# Patient Record
Sex: FEMALE | Race: BLACK OR AFRICAN AMERICAN | NOT HISPANIC OR LATINO | URBAN - METROPOLITAN AREA
[De-identification: names, ages, dates, MRNs, and addresses within clinical notes are randomized per-mention and may not be internally consistent; named-entity substitution may affect disease eponyms.]

---

## 2018-03-14 ENCOUNTER — OUTPATIENT (OUTPATIENT)
Dept: OUTPATIENT SERVICES | Facility: HOSPITAL | Age: 73
LOS: 1 days | End: 2018-03-14
Payer: COMMERCIAL

## 2018-03-14 DIAGNOSIS — E11.9 TYPE 2 DIABETES MELLITUS WITHOUT COMPLICATIONS: ICD-10-CM

## 2018-03-14 DIAGNOSIS — R06.09 OTHER FORMS OF DYSPNEA: ICD-10-CM

## 2018-03-14 DIAGNOSIS — Z98.89 OTHER SPECIFIED POSTPROCEDURAL STATES: Chronic | ICD-10-CM

## 2018-03-14 DIAGNOSIS — Z96.652 PRESENCE OF LEFT ARTIFICIAL KNEE JOINT: Chronic | ICD-10-CM

## 2018-03-14 PROCEDURE — A9500: CPT

## 2018-03-14 PROCEDURE — 93017 CV STRESS TEST TRACING ONLY: CPT

## 2018-03-14 PROCEDURE — A9505: CPT

## 2018-03-14 PROCEDURE — 78452 HT MUSCLE IMAGE SPECT MULT: CPT

## 2018-03-26 ENCOUNTER — APPOINTMENT (OUTPATIENT)
Dept: OTOLARYNGOLOGY | Facility: CLINIC | Age: 73
End: 2018-03-26
Payer: MEDICARE

## 2018-03-26 PROCEDURE — 99204 OFFICE O/P NEW MOD 45 MIN: CPT | Mod: 25

## 2018-03-26 PROCEDURE — 31575 DIAGNOSTIC LARYNGOSCOPY: CPT

## 2018-04-12 ENCOUNTER — OUTPATIENT (OUTPATIENT)
Dept: OUTPATIENT SERVICES | Facility: HOSPITAL | Age: 73
LOS: 1 days | End: 2018-04-12
Payer: COMMERCIAL

## 2018-04-12 ENCOUNTER — APPOINTMENT (OUTPATIENT)
Dept: ULTRASOUND IMAGING | Facility: HOSPITAL | Age: 73
End: 2018-04-12

## 2018-04-12 ENCOUNTER — RESULT REVIEW (OUTPATIENT)
Age: 73
End: 2018-04-12

## 2018-04-12 DIAGNOSIS — Z98.89 OTHER SPECIFIED POSTPROCEDURAL STATES: Chronic | ICD-10-CM

## 2018-04-12 DIAGNOSIS — Z96.652 PRESENCE OF LEFT ARTIFICIAL KNEE JOINT: Chronic | ICD-10-CM

## 2018-04-12 PROCEDURE — 10022: CPT

## 2018-04-12 PROCEDURE — 76942 ECHO GUIDE FOR BIOPSY: CPT | Mod: 26

## 2018-04-12 PROCEDURE — 76942 ECHO GUIDE FOR BIOPSY: CPT

## 2018-04-12 PROCEDURE — 88173 CYTOPATH EVAL FNA REPORT: CPT

## 2018-04-13 LAB — NON-GYNECOLOGICAL CYTOLOGY STUDY: SIGNIFICANT CHANGE UP

## 2018-05-08 ENCOUNTER — APPOINTMENT (OUTPATIENT)
Dept: OTOLARYNGOLOGY | Facility: CLINIC | Age: 73
End: 2018-05-08
Payer: MEDICARE

## 2018-05-08 VITALS
SYSTOLIC BLOOD PRESSURE: 183 MMHG | WEIGHT: 194 LBS | DIASTOLIC BLOOD PRESSURE: 113 MMHG | HEART RATE: 78 BPM | HEIGHT: 64 IN | BODY MASS INDEX: 33.12 KG/M2

## 2018-05-08 PROCEDURE — 99214 OFFICE O/P EST MOD 30 MIN: CPT

## 2018-06-22 VITALS
HEART RATE: 78 BPM | DIASTOLIC BLOOD PRESSURE: 98 MMHG | WEIGHT: 192.9 LBS | TEMPERATURE: 97 F | RESPIRATION RATE: 16 BRPM | HEIGHT: 64 IN | OXYGEN SATURATION: 92 % | SYSTOLIC BLOOD PRESSURE: 155 MMHG

## 2018-06-22 NOTE — H&P ADULT - PSH
H/O laparoscopy  perforated pyloric ulcer  H/O total knee replacement, left  2010  History of bunionectomy of both great toes

## 2018-06-22 NOTE — H&P ADULT - ASSESSMENT
73 yr old F with FHx heart disease, PMHx of HTN, hyperlipidemia, DM, chronic gastritis, osteoarthritis, spinal stenosis s/p decompression surgery in 2016, who initially presented to cardiologist c/o progressively worsening YANG x 3 months, who in light of patient's risk factors, CCS Angina Class III equivalent symptoms and abnormal NST, patient now presents for recommended cardiac catheterization with possible intervention.     ASA: II    Mallampati: IV    Precath/consented  IVF NS @ 75cc/hr  Loaded with ASA 325mg PO x 1 and Plavix 600mg PO x 1

## 2018-06-22 NOTE — H&P ADULT - NSHPLABSRESULTS_GEN_ALL_CORE
13.1   5.5   )-----------( 174      ( 25 Jun 2018 08:43 )             41.0      EKG: Sinus @ 70BPM, LAE, QT/QTc 428/462

## 2018-06-22 NOTE — H&P ADULT - HISTORY OF PRESENT ILLNESS
**WILL BRING MEDS***      73 yr old F with FHx heart disease, PMHx of HTN, hyperlipidemia, DM, chronic gastritis, osteoarthritis, spinal stenosis s/p decompression surgery in 2016, who initially presented to cardiologist c/o progressively worsening YANG x 3 months. Patient states she has noticed a significant decline in her ET which was unlimited with ambulation to now <1/2 city block due to SOB. Patient denies any chest pain, palpitations, dizziness, syncope, PND, orthopnea or LE edema. EKG in the office 6/18/18 revealed NSR with LAE. (unchanged from prior EKG's) Echocardiogram 1/26/18 revealed normal LV size and systolic function, EF >55%. Mild LVH with evidence of diastolic LV dysfunction.     As per MD note, Nuclear Stress test revealed a small area of apical ischemia on perfusion imaging.     In light of patients risk factors, CCS Angina Class III equivalent symptoms and abnormal NST, patient now presents for recommended cardiac catheterization with possible intervention. **WILL BRING MEDS***      73 yr old F with FHx heart disease, PMHx of HTN, hyperlipidemia, DM, chronic gastritis, osteoarthritis, spinal stenosis s/p decompression surgery in 2016, who initially presented to cardiologist c/o progressively worsening YANG x 3 months. Patient states she has noticed a significant decline in her ET which was unlimited with ambulation to now <1/2 city block due to SOB. Patient denies any chest pain, palpitations, dizziness, syncope, PND, orthopnea or LE edema. EKG in the office 6/18/18 revealed NSR with LAE. (unchanged from prior EKG's) Echocardiogram 1/26/18 revealed normal LV size and systolic function, EF >55%. Mild LVH with evidence of diastolic LV dysfunction.     Patient further underwent Nuclear Stress test 3/14/18 which revealed a small area ischemia in the apex, EF:65%.    In light of patients risk factors, CCS Angina Class III equivalent symptoms and abnormal NST, patient now presents for recommended cardiac catheterization with possible intervention. 73 yr old F with FHx heart disease, PMHx of HTN, hyperlipidemia, DM, chronic gastritis, osteoarthritis, spinal stenosis s/p decompression surgery in 2016, who initially presented to cardiologist c/o progressively worsening YANG x 3 months. Patient states she has noticed a significant decline in her ET which was unlimited with ambulation to now <1/2 city block due to SOB. Patient denies any chest pain, palpitations, dizziness, syncope, PND, orthopnea or LE edema. EKG in the office 6/18/18 revealed NSR with LAE. (unchanged from prior EKG's) Echocardiogram 1/26/18 revealed normal LV size and systolic function, EF >55%. Mild LVH with evidence of diastolic LV dysfunction. Patient further underwent Nuclear Stress test 3/14/18 which revealed a small area ischemia in the apex, EF:65%. In light of patients risk factors, CCS Angina Class III equivalent symptoms and abnormal NST, patient now presents for recommended cardiac catheterization with possible intervention.

## 2018-06-22 NOTE — H&P ADULT - PMH
Diverticulitis    DM (diabetes mellitus)  type 2  HLD (hyperlipidemia)    HTN (hypertension)    Lumbar stenosis    OA (osteoarthritis)

## 2018-06-25 ENCOUNTER — OUTPATIENT (OUTPATIENT)
Dept: OUTPATIENT SERVICES | Facility: HOSPITAL | Age: 73
LOS: 1 days | Discharge: NURSING, REHAB OR LONG-TERM CARE | End: 2018-06-25
Payer: COMMERCIAL

## 2018-06-25 DIAGNOSIS — Z98.89 OTHER SPECIFIED POSTPROCEDURAL STATES: Chronic | ICD-10-CM

## 2018-06-25 DIAGNOSIS — Z96.652 PRESENCE OF LEFT ARTIFICIAL KNEE JOINT: Chronic | ICD-10-CM

## 2018-06-25 LAB
ANION GAP SERPL CALC-SCNC: 8 MMOL/L — SIGNIFICANT CHANGE UP (ref 5–17)
APTT BLD: 40.2 SEC — HIGH (ref 27.5–37.4)
BASOPHILS NFR BLD AUTO: 0.2 % — SIGNIFICANT CHANGE UP (ref 0–2)
BUN SERPL-MCNC: 21 MG/DL — SIGNIFICANT CHANGE UP (ref 7–23)
CALCIUM SERPL-MCNC: 9.4 MG/DL — SIGNIFICANT CHANGE UP (ref 8.4–10.5)
CHLORIDE SERPL-SCNC: 102 MMOL/L — SIGNIFICANT CHANGE UP (ref 96–108)
CHOLEST SERPL-MCNC: 164 MG/DL — SIGNIFICANT CHANGE UP (ref 10–199)
CO2 SERPL-SCNC: 28 MMOL/L — SIGNIFICANT CHANGE UP (ref 22–31)
CREAT SERPL-MCNC: 0.88 MG/DL — SIGNIFICANT CHANGE UP (ref 0.5–1.3)
CRP SERPL-MCNC: 0.1 MG/DL — SIGNIFICANT CHANGE UP (ref 0–0.4)
EOSINOPHIL NFR BLD AUTO: 2.7 % — SIGNIFICANT CHANGE UP (ref 0–6)
GLUCOSE SERPL-MCNC: 128 MG/DL — HIGH (ref 70–99)
HBA1C BLD-MCNC: 6.6 % — HIGH (ref 4–5.6)
HCT VFR BLD CALC: 41 % — SIGNIFICANT CHANGE UP (ref 34.5–45)
HDLC SERPL-MCNC: 52 MG/DL — SIGNIFICANT CHANGE UP (ref 40–125)
HGB BLD-MCNC: 13.1 G/DL — SIGNIFICANT CHANGE UP (ref 11.5–15.5)
INR BLD: 1.24 — HIGH (ref 0.88–1.16)
LIPID PNL WITH DIRECT LDL SERPL: 99 MG/DL — SIGNIFICANT CHANGE UP
LYMPHOCYTES # BLD AUTO: 42.1 % — SIGNIFICANT CHANGE UP (ref 13–44)
MCHC RBC-ENTMCNC: 27.6 PG — SIGNIFICANT CHANGE UP (ref 27–34)
MCHC RBC-ENTMCNC: 32 G/DL — SIGNIFICANT CHANGE UP (ref 32–36)
MCV RBC AUTO: 86.3 FL — SIGNIFICANT CHANGE UP (ref 80–100)
MONOCYTES NFR BLD AUTO: 7 % — SIGNIFICANT CHANGE UP (ref 2–14)
NEUTROPHILS NFR BLD AUTO: 48 % — SIGNIFICANT CHANGE UP (ref 43–77)
PLATELET # BLD AUTO: 174 K/UL — SIGNIFICANT CHANGE UP (ref 150–400)
POTASSIUM SERPL-MCNC: SIGNIFICANT CHANGE UP MMOL/L (ref 3.5–5.3)
POTASSIUM SERPL-SCNC: SIGNIFICANT CHANGE UP MMOL/L (ref 3.5–5.3)
PROTHROM AB SERPL-ACNC: 13.8 SEC — HIGH (ref 9.8–12.7)
RBC # BLD: 4.75 M/UL — SIGNIFICANT CHANGE UP (ref 3.8–5.2)
RBC # FLD: 15.2 % — SIGNIFICANT CHANGE UP (ref 10.3–16.9)
SODIUM SERPL-SCNC: 138 MMOL/L — SIGNIFICANT CHANGE UP (ref 135–145)
TOTAL CHOLESTEROL/HDL RATIO MEASUREMENT: 3.2 RATIO — LOW (ref 3.3–7.1)
TRIGL SERPL-MCNC: 67 MG/DL — SIGNIFICANT CHANGE UP (ref 10–149)
WBC # BLD: 5.5 K/UL — SIGNIFICANT CHANGE UP (ref 3.8–10.5)
WBC # FLD AUTO: 5.5 K/UL — SIGNIFICANT CHANGE UP (ref 3.8–10.5)

## 2018-06-25 PROCEDURE — 82553 CREATINE MB FRACTION: CPT

## 2018-06-25 PROCEDURE — C1769: CPT

## 2018-06-25 PROCEDURE — 80061 LIPID PANEL: CPT

## 2018-06-25 PROCEDURE — 93571 IV DOP VEL&/PRESS C FLO 1ST: CPT | Mod: RC

## 2018-06-25 PROCEDURE — 85730 THROMBOPLASTIN TIME PARTIAL: CPT

## 2018-06-25 PROCEDURE — 36415 COLL VENOUS BLD VENIPUNCTURE: CPT

## 2018-06-25 PROCEDURE — 93458 L HRT ARTERY/VENTRICLE ANGIO: CPT

## 2018-06-25 PROCEDURE — 85610 PROTHROMBIN TIME: CPT

## 2018-06-25 PROCEDURE — 93005 ELECTROCARDIOGRAM TRACING: CPT

## 2018-06-25 PROCEDURE — 80048 BASIC METABOLIC PNL TOTAL CA: CPT

## 2018-06-25 PROCEDURE — 82962 GLUCOSE BLOOD TEST: CPT

## 2018-06-25 PROCEDURE — 86140 C-REACTIVE PROTEIN: CPT

## 2018-06-25 PROCEDURE — 93458 L HRT ARTERY/VENTRICLE ANGIO: CPT | Mod: 26

## 2018-06-25 PROCEDURE — 82550 ASSAY OF CK (CPK): CPT

## 2018-06-25 PROCEDURE — 85025 COMPLETE CBC W/AUTO DIFF WBC: CPT

## 2018-06-25 PROCEDURE — C1887: CPT

## 2018-06-25 PROCEDURE — 93010 ELECTROCARDIOGRAM REPORT: CPT

## 2018-06-25 PROCEDURE — 83036 HEMOGLOBIN GLYCOSYLATED A1C: CPT

## 2018-06-25 PROCEDURE — 93571 IV DOP VEL&/PRESS C FLO 1ST: CPT | Mod: 26,59,RC

## 2018-06-25 RX ORDER — SODIUM CHLORIDE 9 MG/ML
500 INJECTION INTRAMUSCULAR; INTRAVENOUS; SUBCUTANEOUS
Qty: 0 | Refills: 0 | Status: DISCONTINUED | OUTPATIENT
Start: 2018-06-25 | End: 2018-06-25

## 2018-06-25 RX ORDER — HYDRALAZINE HCL 50 MG
5 TABLET ORAL ONCE
Qty: 0 | Refills: 0 | Status: COMPLETED | OUTPATIENT
Start: 2018-06-25 | End: 2018-06-25

## 2018-06-25 RX ORDER — METOPROLOL TARTRATE 50 MG
100 TABLET ORAL DAILY
Qty: 0 | Refills: 0 | Status: DISCONTINUED | OUTPATIENT
Start: 2018-06-26 | End: 2018-06-25

## 2018-06-25 RX ORDER — CHLORHEXIDINE GLUCONATE 213 G/1000ML
1 SOLUTION TOPICAL ONCE
Qty: 0 | Refills: 0 | Status: DISCONTINUED | OUTPATIENT
Start: 2018-06-25 | End: 2018-06-25

## 2018-06-25 RX ORDER — PANTOPRAZOLE SODIUM 20 MG/1
40 TABLET, DELAYED RELEASE ORAL DAILY
Qty: 0 | Refills: 0 | Status: DISCONTINUED | OUTPATIENT
Start: 2018-06-25 | End: 2018-06-25

## 2018-06-25 RX ORDER — ASPIRIN/CALCIUM CARB/MAGNESIUM 324 MG
325 TABLET ORAL ONCE
Qty: 0 | Refills: 0 | Status: COMPLETED | OUTPATIENT
Start: 2018-06-25 | End: 2018-06-25

## 2018-06-25 RX ORDER — ASPIRIN/CALCIUM CARB/MAGNESIUM 324 MG
81 TABLET ORAL DAILY
Qty: 0 | Refills: 0 | Status: DISCONTINUED | OUTPATIENT
Start: 2018-06-26 | End: 2018-06-25

## 2018-06-25 RX ORDER — CLOPIDOGREL BISULFATE 75 MG/1
600 TABLET, FILM COATED ORAL ONCE
Qty: 0 | Refills: 0 | Status: COMPLETED | OUTPATIENT
Start: 2018-06-25 | End: 2018-06-25

## 2018-06-25 RX ADMIN — Medication 325 MILLIGRAM(S): at 09:35

## 2018-06-25 RX ADMIN — Medication 5 MILLIGRAM(S): at 12:52

## 2018-06-25 RX ADMIN — CLOPIDOGREL BISULFATE 600 MILLIGRAM(S): 75 TABLET, FILM COATED ORAL at 09:35

## 2018-06-25 RX ADMIN — SODIUM CHLORIDE 75 MILLILITER(S): 9 INJECTION INTRAMUSCULAR; INTRAVENOUS; SUBCUTANEOUS at 11:51

## 2018-06-25 NOTE — PROGRESS NOTE ADULT - SUBJECTIVE AND OBJECTIVE BOX
Interventional Cardiology PA SDA Discharge Note    Patient without complaints. Ambulated and voided without difficulties    Afebrile, VSS    Ext: Right Radial : No hematoma, no bleeding, dressing; C/D/I      Pulses:    intact RAD/DP/PT to baseline     A/P:  73 yr old F with FHx heart disease, PMHx of HTN, hyperlipidemia, DM, chronic gastritis, osteoarthritis, spinal stenosis s/p decompression surgery in 2016, who initially presented to cardiologist c/o progressively worsening YANG x 3 months, who in light of patient's risk factors, CCS Angina Class III equivalent symptoms and abnormal NST, patient presented for recommended cardiac catheterization. Pt is now s/p diagnostic cardiac cath on 6/25/18 rvealing non-obstructive CAD, 30-50% mLAD, EF 60%, no AS, 1+ MR. Recommended optimization of medical management and f/u with Dr. Wilkerson.      1.	Stable for discharge as per attending Dr. Pham after bed rest, pt voids, groin/wrist stable and 30 minutes of ambulation.  2.	Follow-up with PMD/Cardiologist Dr. Wilkerson in 1-2 weeks  3.	Discharged forms signed and copies in chart

## 2018-08-14 ENCOUNTER — APPOINTMENT (OUTPATIENT)
Dept: OTOLARYNGOLOGY | Facility: CLINIC | Age: 73
End: 2018-08-14
Payer: MEDICARE

## 2018-08-14 VITALS
HEART RATE: 76 BPM | WEIGHT: 184 LBS | BODY MASS INDEX: 31.41 KG/M2 | DIASTOLIC BLOOD PRESSURE: 118 MMHG | HEIGHT: 64 IN | SYSTOLIC BLOOD PRESSURE: 178 MMHG

## 2018-08-14 DIAGNOSIS — R05 COUGH: ICD-10-CM

## 2018-08-14 DIAGNOSIS — R07.0 PAIN IN THROAT: ICD-10-CM

## 2018-08-14 DIAGNOSIS — R68.89 OTHER GENERAL SYMPTOMS AND SIGNS: ICD-10-CM

## 2018-08-14 PROCEDURE — 99214 OFFICE O/P EST MOD 30 MIN: CPT

## 2019-06-14 ENCOUNTER — APPOINTMENT (OUTPATIENT)
Dept: PULMONOLOGY | Facility: CLINIC | Age: 74
End: 2019-06-14
Payer: MEDICARE

## 2019-06-14 VITALS
HEART RATE: 80 BPM | WEIGHT: 187.31 LBS | TEMPERATURE: 97.4 F | HEIGHT: 64 IN | DIASTOLIC BLOOD PRESSURE: 80 MMHG | BODY MASS INDEX: 31.98 KG/M2 | SYSTOLIC BLOOD PRESSURE: 140 MMHG | OXYGEN SATURATION: 96 %

## 2019-06-14 PROCEDURE — 99204 OFFICE O/P NEW MOD 45 MIN: CPT | Mod: 25

## 2019-06-14 PROCEDURE — 94010 BREATHING CAPACITY TEST: CPT

## 2019-06-14 RX ORDER — AMOXICILLIN 500 MG/1
500 CAPSULE ORAL
Qty: 16 | Refills: 0 | Status: DISCONTINUED | COMMUNITY
Start: 2017-11-29 | End: 2019-06-14

## 2019-06-14 NOTE — PHYSICAL EXAM
[Normal Conjunctiva] : the conjunctiva exhibited no abnormalities [Normal Oropharynx] : normal oropharynx [Heart Sounds] : normal S1 and S2 [Heart Rate And Rhythm] : heart rate and rhythm were normal [Murmurs] : no murmurs present [Auscultation Breath Sounds / Voice Sounds] : lungs were clear to auscultation bilaterally [Bowel Sounds] : normal bowel sounds [Abdomen Soft] : soft [Abdomen Tenderness] : non-tender [FreeTextEntry1] : no palpable abnormality in painful area, no change in pain with palpation [Nail Clubbing] : no clubbing of the fingernails [Cyanosis, Localized] : no localized cyanosis [] : no rash [Affect] : the affect was normal

## 2019-06-14 NOTE — CONSULT LETTER
[Dear  ___] : Dear  [unfilled], [( Thank you for referring [unfilled] for consultation for _____ )] : Thank you for referring [unfilled] for consultation for [unfilled] [Please see my note below.] : Please see my note below. [Consult Closing:] : Thank you very much for allowing me to participate in the care of this patient.  If you have any questions, please do not hesitate to contact me. [Sincerely,] : Sincerely, [FreeTextEntry2] : Dionisio Sharma MD\par [address] \[place] [address] [FreeTextEntry3] : Christy Fish MD, FCCP\par

## 2019-06-14 NOTE — ASSESSMENT
[FreeTextEntry1] : Data reviewed:\par \par CT chest [facility] 5/2019 personally reviewed: enlarged PA, some mild basilar fibrotic change, no source of pain, no mass\par \par Arnoldo 06/14/2019 : restricted\par \par Impression:\par Musculoskeletal chest pain\par \par Plan:\par This severe pain does not have a pulmonary etiology. She says Dr Sharma is sending her to pain management. Would suggest as well consideration of physiatry evaluation.

## 2019-06-14 NOTE — REVIEW OF SYSTEMS
[As Noted in HPI] : as noted in HPI [Diabetes] : diabetes mellitus [Trauma] : no ~T physical trauma [Negative] : Psychiatric

## 2019-06-14 NOTE — HISTORY OF PRESENT ILLNESS
[FreeTextEntry1] : 06/14/2019: Asked to evaluate patient by Dr Sharma for chest pain, 45 min late for new eval due to [employer]. Since April pain in rib cage starting in midaxillary line on R side. Became very severe, progressively worse, difficult to do her activities. Had imaging, didn't show anything. Gets dyspneic w the pain. Movement makes it worse but not breathing. Aching, stabbing, squeezing. No leg swelling or pain. Remote social smoking, never a daily smoker. Never asthma. She was given baclofen and takes Tylenol and that helps.\par

## 2019-07-25 ENCOUNTER — APPOINTMENT (OUTPATIENT)
Dept: NEUROSURGERY | Facility: CLINIC | Age: 74
End: 2019-07-25
Payer: MEDICARE

## 2019-07-25 VITALS
OXYGEN SATURATION: 95 % | HEIGHT: 64 IN | DIASTOLIC BLOOD PRESSURE: 97 MMHG | WEIGHT: 190 LBS | RESPIRATION RATE: 16 BRPM | SYSTOLIC BLOOD PRESSURE: 182 MMHG | HEART RATE: 63 BPM | BODY MASS INDEX: 32.44 KG/M2

## 2019-07-25 DIAGNOSIS — I10 ESSENTIAL (PRIMARY) HYPERTENSION: ICD-10-CM

## 2019-07-25 PROCEDURE — 99204 OFFICE O/P NEW MOD 45 MIN: CPT

## 2019-07-25 RX ORDER — ACETAMINOPHEN 325 MG/1
TABLET, FILM COATED ORAL
Refills: 0 | Status: ACTIVE | COMMUNITY

## 2019-07-29 ENCOUNTER — OUTPATIENT (OUTPATIENT)
Dept: OUTPATIENT SERVICES | Facility: HOSPITAL | Age: 74
LOS: 1 days | End: 2019-07-29
Payer: COMMERCIAL

## 2019-07-29 DIAGNOSIS — M54.6 PAIN IN THORACIC SPINE: ICD-10-CM

## 2019-07-29 DIAGNOSIS — Z98.89 OTHER SPECIFIED POSTPROCEDURAL STATES: Chronic | ICD-10-CM

## 2019-07-29 DIAGNOSIS — Z96.652 PRESENCE OF LEFT ARTIFICIAL KNEE JOINT: Chronic | ICD-10-CM

## 2019-07-29 DIAGNOSIS — Z22.321 CARRIER OR SUSPECTED CARRIER OF METHICILLIN SUSCEPTIBLE STAPHYLOCOCCUS AUREUS: ICD-10-CM

## 2019-07-29 LAB
MRSA PCR RESULT.: NEGATIVE — SIGNIFICANT CHANGE UP
S AUREUS DNA NOSE QL NAA+PROBE: NEGATIVE — SIGNIFICANT CHANGE UP

## 2019-07-29 PROCEDURE — 87641 MR-STAPH DNA AMP PROBE: CPT

## 2019-08-14 NOTE — DATA REVIEWED
[de-identified] : Exam requested by:\par DORIAN TORIBIO MD\par [address]\[place] [address]\par SITE PERFORMED: [facility]\par Patient: ONUR AADMS\par YOB: 1945\par Phone: [phone]\par MRN: [id number] Acc: [id number]\par Date of Exam: 07-\par EXAM:  MRI THORACIC SPINE WITHOUT CONTRAST\par \par HISTORY:  Pain.\par \par TECHNIQUE: MRI of the thoracic spine was performed without intravenous contrast. Sequences include: Sagittal T1, T2, STIR; Axial T2.\par \par COMPARISON: Chest CT performed 5/9/2019.\par \par FINDINGS: There is an intraspinal mass lesion along the dorsal aspect of the spinal canal extending from the lower T3 level superiorly through the upper T5 level inferiorly. The intraspinal component of the mass measures approximately 3.1 cm craniocaudal by 1.0 cm anteroposterior by 1.3 cm transverse. There is a suggestion of indentation of the dorsal thecal sac dura on sagittal T2 series 5 image 8 and axial T2 series 7 image 8, raising the possibility that this may be an extradural, extramedullary lesion rather than intradural, extramedullary. The possibility that this lesion represents extraosseous extension of a lesion arising from the T4 spinous process is considered, given abnormal hyperintense STIR marrow signal replacing the adjacent T4 spinous process. In addition, there is abnormal marrow signal extending into the right and left T4 laminae and transverse processes bilaterally, which may represent mass lesion extension and/or pathologic fracture(s).\par \par Also noted is abnormal hyperintense marrow signal in the left posterior superior aspect of the T4 vertebral body on sagittal STIR series 6 image 6, not meeting definite imaging features for an atypical vertebral hemangioma and suspicious for an additional abnormal marrow lesion. An additional well-circumscribed focus of hyperintense STIR signal along the L1 inferior endplate is nonspecific. \par \par Overall, neoplastic etiologies including multiple myeloma and metastatic disease must be considered.\par \par There is associated compression of the spinal cord spanning the T4 level, from the T3-4 level superiorly through the T4-5 level inferiorly. Possible abnormal spinal cord signal is noted at this level on sagittal STIR and T2-weighted images.\par \par There is mild S-shaped curvature of the thoracic spine. Vertebral body heights and alignment in the sagittal plane are otherwise maintained. There is mild multilevel thoracic disc space narrowing and desiccation.\par \par At T1-2 there is moderate disc bulge indenting the ventral thecal sac.\par \par T2-3, T3-4, C4-5, T6-7, T7-8, T8-9, T9-10, T10-11, T12-L1, L1-2 mild disc bulges.\par \par T11-12 mild/moderate disc bulge with superimposed right foraminal disc protrusion.\par \par There is mild/moderate multilevel facet arthrosis. Neural foraminal narrowing is as follows:\par -Right: Moderate/severe C7-T1, severe T1-2, mild T2-3, moderate T11-12 and L2-3.\par -Left: Moderate/severe C7-T1 and T1-2, mild T2-3, mild T7-8, T8-9, and moderate L2-3.\par \par There are round, well-circumscribed right retrocrural T2 hyperintense nodules measuring up to 1.7 cm, which may represent lymph nodes or perhaps dilated lymphatics. Consider follow-up with dedicated abdominal/pelvic imaging, as warranted clinically.\par \par IMPRESSION:\par 1. Approximately 3.1 x 1.0 x 1.3 cm intraspinal mass lesion spanning the dorsal spinal canal from the lower T3 level through the upper T5 level exhibits imaging features suggestive of extradural location and results in spinal cord compression with possible abnormal spinal cord signal. This mass may be contiguous with a lesion replacing the T4 spinous process, with abnormal marrow signal in the bilateral T4 lamina/transverse processes possibly representing lesion extension and/or pathologic fracture(s). Suggest follow-up with MRI of the thoracic spine with and without contrast.\par 2. Additional marrow lesion in the left posterior superior aspect of the T4 vertebral body.\par 3. Overall, findings are highly suspicious for a neoplastic etiology; metastatic disease and multiple myeloma must be considered, among other entities.\par 4. Round, well-circumscribed right retrocrural T2-hyperintense nodules measuring up to 1.7 cm may represent abnormal lymph nodes or perhaps dilated lymphatics. Consider follow-up with dedicated abdominal/pelvic imaging, as warranted clinically.\par 5. Multilevel degenerative changes.\par \par Discussed via telephone with Marcus Acevedo NP at 11:10 AM on 7/12/2019.\par \par \par Thank you for the opportunity to participate in the care of this patient.  \par  \par Mathieu Mast MD [phone]\par Electronically Signed: 07- 11:18 AM\par Exam requested by:DORIAN TORIBIO MD\par

## 2019-08-14 NOTE — PLAN
[FreeTextEntry1] : \par \par Plan:\par \par 1. CT of the abdomen, chest and pelvis to r/o metastatic disease\par 2. Plan for a T3-4, T4-5 Laminectomy with T4 Decompression surgery on 8/20/19\par 3. Patient will need to see IR for a localizing shiloh of thoracic mass at T4 the morning of surgery\par 4. Patient will see her PCP for medical clearance Per patient, she has questionable  dx of COPD and has no Pulmonologist. PCP will discern if patient will require Pulm Clearance\par \par Patient verbalizes agreement and understanding with plan of care.

## 2019-08-14 NOTE — ADDENDUM
[FreeTextEntry1] : July 25, 2019\par \par Dionisio Sharma MD\par [address] [facility]\[place] [address]\par \par Re:	Yoan Holcomb \par \par Dear Dionisio:\par \par I saw Ms. Holcomb in followup today.  As you know, we performed a lumbar decompression in 2016.  However, she presents now with more or less failure to thrive and pain in her mid-back with lower extremity weakness.  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  I have asked Ms. Holcomb to obtain a full chest, abdomen, and pelvic CT to rule out any secondary source of this tumor, but this is likely a primary spinal tumor with severe cord compression.  Here in the office, she is actually normoreflexic if anything, and there is no obvious evidence of myelopathy, but I do think, due to the severity of her cord compression at T4, that surgical decompression is necessary.  \par \par I have recommended surgical decompression of the tumor with a T3 through T5 laminectomy.  We additionally will rule out a metastasis by performing a full-body CT and will likely recommend placement of a preoperative localizing shiloh to identify the T4 level intraoperatively.\par \par We will hopefully be setting this up in the next few weeks and will need preoperative medical clearance.  I will certainly keep you abreast of her progress and appreciate your kind referral.\par \par Sincerely,\par \par \par \par Maritr Bearden MD\par \par DL/[facility] #[id number]\par \par CC:	Kalin Kiran M.D.\par 	[address] [facility]\[place] [address]\par

## 2019-08-14 NOTE — REASON FOR VISIT
[Consultation] : a consultation visit [Referred By: _________] : Patient was referred by JOSE DE JESUS [FreeTextEntry1] : Thoracic Spine Mass

## 2019-08-14 NOTE — PHYSICAL EXAM
[General Appearance - Alert] : alert [General Appearance - Well Nourished] : well nourished [General Appearance - Well-Appearing] : healthy appearing [Oriented To Time, Place, And Person] : oriented to person, place, and time [No Visual Abnormalities] : no visible abnormailities [Non- Antalgic] : non-antalgic [Intact] : all motor groups within normal limits of strength and tone bilaterally [Sclera] : the sclera and conjunctiva were normal [Outer Ear] : the ears and nose were normal in appearance [Neck Appearance] : the appearance of the neck was normal [] : no respiratory distress [Skin Color & Pigmentation] : normal skin color and pigmentation [Motor Tone] : muscle tone was normal in all four extremities [Motor Strength] : muscle strength was normal in all four extremities [Able to toe walk] : the patient was not able to toe walk [Able to heel walk] : the patient was not able to heel walk [FreeTextEntry1] : shuffling gait

## 2019-08-14 NOTE — HISTORY OF PRESENT ILLNESS
[de-identified] : Patient with a known history of L2-L5 laminectomy in 2016  for debilitating neurogenic claudication with successful symptom results. She has been doing well until she began to experience mid back pain unrelieved by Tylenol since April of this year. She states that she requested additional imaging at that time, but she was instead referred to Dr. Kiran for pain management. She was started on Meloxicam and Baclofen with little symptom relief, so a T-Spine MRI was ordered revealing a 3.1 x1.0 x 1.3 cm mass extending from T3-T5 with T4 cord compression. MRI also noted a marrow lesion in the left posterior aspect of T4 as well as a 1.7cm T2 nodule that is concerning for possible neoplasm and/or metastatic disease. Patient denies BLE weakness and/or pain and does not use assistive devices to aid in ambulation, she does endorse worsening urinary incontinence. \par \par She presents today with images to review and discuss a treatment plan.

## 2019-08-23 ENCOUNTER — MOBILE ON CALL (OUTPATIENT)
Age: 74
End: 2019-08-23

## 2019-08-26 VITALS
RESPIRATION RATE: 16 BRPM | OXYGEN SATURATION: 95 % | DIASTOLIC BLOOD PRESSURE: 84 MMHG | WEIGHT: 186.73 LBS | HEIGHT: 64 IN | SYSTOLIC BLOOD PRESSURE: 168 MMHG | TEMPERATURE: 97 F | HEART RATE: 88 BPM

## 2019-08-27 ENCOUNTER — RESULT REVIEW (OUTPATIENT)
Age: 74
End: 2019-08-27

## 2019-08-27 ENCOUNTER — INPATIENT (INPATIENT)
Facility: HOSPITAL | Age: 74
LOS: 2 days | Discharge: HOME | DRG: 821 | End: 2019-08-30
Attending: NEUROLOGICAL SURGERY | Admitting: NEUROLOGICAL SURGERY
Payer: MEDICARE

## 2019-08-27 DIAGNOSIS — G95.29 OTHER CORD COMPRESSION: ICD-10-CM

## 2019-08-27 DIAGNOSIS — E66.9 OBESITY, UNSPECIFIED: ICD-10-CM

## 2019-08-27 DIAGNOSIS — R22.2 LOCALIZED SWELLING, MASS AND LUMP, TRUNK: ICD-10-CM

## 2019-08-27 DIAGNOSIS — C49.6 MALIGNANT NEOPLASM OF CONNECTIVE AND SOFT TISSUE OF TRUNK, UNSPECIFIED: ICD-10-CM

## 2019-08-27 DIAGNOSIS — Z98.89 OTHER SPECIFIED POSTPROCEDURAL STATES: Chronic | ICD-10-CM

## 2019-08-27 DIAGNOSIS — J44.9 CHRONIC OBSTRUCTIVE PULMONARY DISEASE, UNSPECIFIED: ICD-10-CM

## 2019-08-27 DIAGNOSIS — R09.89 OTHER SPECIFIED SYMPTOMS AND SIGNS INVOLVING THE CIRCULATORY AND RESPIRATORY SYSTEMS: ICD-10-CM

## 2019-08-27 DIAGNOSIS — C90.30 SOLITARY PLASMACYTOMA NOT HAVING ACHIEVED REMISSION: ICD-10-CM

## 2019-08-27 DIAGNOSIS — Z79.4 LONG TERM (CURRENT) USE OF INSULIN: ICD-10-CM

## 2019-08-27 DIAGNOSIS — M19.90 UNSPECIFIED OSTEOARTHRITIS, UNSPECIFIED SITE: ICD-10-CM

## 2019-08-27 DIAGNOSIS — E11.9 TYPE 2 DIABETES MELLITUS WITHOUT COMPLICATIONS: ICD-10-CM

## 2019-08-27 DIAGNOSIS — I10 ESSENTIAL (PRIMARY) HYPERTENSION: ICD-10-CM

## 2019-08-27 DIAGNOSIS — Z96.651 PRESENCE OF RIGHT ARTIFICIAL KNEE JOINT: Chronic | ICD-10-CM

## 2019-08-27 DIAGNOSIS — Z96.652 PRESENCE OF LEFT ARTIFICIAL KNEE JOINT: Chronic | ICD-10-CM

## 2019-08-27 LAB
ANION GAP SERPL CALC-SCNC: 9 MMOL/L — SIGNIFICANT CHANGE UP (ref 5–17)
APTT BLD: 34.2 SEC — SIGNIFICANT CHANGE UP (ref 27.5–36.3)
APTT BLD: 45 SEC — HIGH (ref 27.5–36.3)
BASE EXCESS BLDA CALC-SCNC: -3 MMOL/L — LOW (ref -2–3)
BASOPHILS # BLD AUTO: 0.01 K/UL — SIGNIFICANT CHANGE UP (ref 0–0.2)
BASOPHILS NFR BLD AUTO: 0.2 % — SIGNIFICANT CHANGE UP (ref 0–2)
BUN SERPL-MCNC: 18 MG/DL — SIGNIFICANT CHANGE UP (ref 7–23)
CA-I BLDA-SCNC: 1.16 MMOL/L — SIGNIFICANT CHANGE UP (ref 1.12–1.3)
CALCIUM SERPL-MCNC: 8.8 MG/DL — SIGNIFICANT CHANGE UP (ref 8.4–10.5)
CHLORIDE SERPL-SCNC: 108 MMOL/L — SIGNIFICANT CHANGE UP (ref 96–108)
CO2 SERPL-SCNC: 25 MMOL/L — SIGNIFICANT CHANGE UP (ref 22–31)
COHGB MFR BLDA: 0.7 % — SIGNIFICANT CHANGE UP
CREAT SERPL-MCNC: 0.74 MG/DL — SIGNIFICANT CHANGE UP (ref 0.5–1.3)
EOSINOPHIL # BLD AUTO: 0.04 K/UL — SIGNIFICANT CHANGE UP (ref 0–0.5)
EOSINOPHIL NFR BLD AUTO: 0.9 % — SIGNIFICANT CHANGE UP (ref 0–6)
GAS PNL BLDA: SIGNIFICANT CHANGE UP
GLUCOSE BLDC GLUCOMTR-MCNC: 111 MG/DL — HIGH (ref 70–99)
GLUCOSE BLDC GLUCOMTR-MCNC: 242 MG/DL — HIGH (ref 70–99)
GLUCOSE SERPL-MCNC: 160 MG/DL — HIGH (ref 70–99)
HCO3 BLDA-SCNC: 23 MMOL/L — SIGNIFICANT CHANGE UP (ref 21–28)
HCT VFR BLD CALC: 27.1 % — LOW (ref 34.5–45)
HCT VFR BLD CALC: 34.1 % — LOW (ref 34.5–45)
HGB BLD-MCNC: 10.7 G/DL — LOW (ref 11.5–15.5)
HGB BLD-MCNC: 8.1 G/DL — LOW (ref 11.5–15.5)
HGB BLDA-MCNC: 8.7 G/DL — LOW (ref 11.5–15.5)
IMM GRANULOCYTES NFR BLD AUTO: 0.7 % — SIGNIFICANT CHANGE UP (ref 0–1.5)
INR BLD: 1.09 — SIGNIFICANT CHANGE UP (ref 0.88–1.16)
INR BLD: 1.29 — HIGH (ref 0.88–1.16)
LYMPHOCYTES # BLD AUTO: 1.11 K/UL — SIGNIFICANT CHANGE UP (ref 1–3.3)
LYMPHOCYTES # BLD AUTO: 25.8 % — SIGNIFICANT CHANGE UP (ref 13–44)
MAGNESIUM SERPL-MCNC: 1.8 MG/DL — SIGNIFICANT CHANGE UP (ref 1.6–2.6)
MCHC RBC-ENTMCNC: 27.4 PG — SIGNIFICANT CHANGE UP (ref 27–34)
MCHC RBC-ENTMCNC: 28.2 PG — SIGNIFICANT CHANGE UP (ref 27–34)
MCHC RBC-ENTMCNC: 29.9 GM/DL — LOW (ref 32–36)
MCHC RBC-ENTMCNC: 31.4 GM/DL — LOW (ref 32–36)
MCV RBC AUTO: 90 FL — SIGNIFICANT CHANGE UP (ref 80–100)
MCV RBC AUTO: 91.6 FL — SIGNIFICANT CHANGE UP (ref 80–100)
METHGB MFR BLDA: 0.2 % — SIGNIFICANT CHANGE UP
MONOCYTES # BLD AUTO: 0.13 K/UL — SIGNIFICANT CHANGE UP (ref 0–0.9)
MONOCYTES NFR BLD AUTO: 3 % — SIGNIFICANT CHANGE UP (ref 2–14)
NEUTROPHILS # BLD AUTO: 2.98 K/UL — SIGNIFICANT CHANGE UP (ref 1.8–7.4)
NEUTROPHILS NFR BLD AUTO: 69.4 % — SIGNIFICANT CHANGE UP (ref 43–77)
NRBC # BLD: 0 /100 WBCS — SIGNIFICANT CHANGE UP (ref 0–0)
NRBC # BLD: 0 /100 WBCS — SIGNIFICANT CHANGE UP (ref 0–0)
O2 CT VFR BLDA CALC: SIGNIFICANT CHANGE UP (ref 15–23)
OXYHGB MFR BLDA: 99 % — SIGNIFICANT CHANGE UP (ref 94–100)
PCO2 BLDA: 49 MMHG — HIGH (ref 32–45)
PH BLDA: 7.3 — LOW (ref 7.35–7.45)
PHOSPHATE SERPL-MCNC: 3.5 MG/DL — SIGNIFICANT CHANGE UP (ref 2.5–4.5)
PLATELET # BLD AUTO: 155 K/UL — SIGNIFICANT CHANGE UP (ref 150–400)
PLATELET # BLD AUTO: 205 K/UL — SIGNIFICANT CHANGE UP (ref 150–400)
PO2 BLDA: 216 MMHG — HIGH (ref 83–108)
POTASSIUM BLDA-SCNC: 4 MMOL/L — SIGNIFICANT CHANGE UP (ref 3.5–4.9)
POTASSIUM SERPL-MCNC: 3.9 MMOL/L — SIGNIFICANT CHANGE UP (ref 3.5–5.3)
POTASSIUM SERPL-SCNC: 3.9 MMOL/L — SIGNIFICANT CHANGE UP (ref 3.5–5.3)
PROTHROM AB SERPL-ACNC: 12.4 SEC — SIGNIFICANT CHANGE UP (ref 10–12.9)
PROTHROM AB SERPL-ACNC: 14.7 SEC — HIGH (ref 10–12.9)
RBC # BLD: 2.96 M/UL — LOW (ref 3.8–5.2)
RBC # BLD: 3.79 M/UL — LOW (ref 3.8–5.2)
RBC # FLD: 15.2 % — HIGH (ref 10.3–14.5)
RBC # FLD: 15.7 % — HIGH (ref 10.3–14.5)
SAO2 % BLDA: 100 % — SIGNIFICANT CHANGE UP (ref 95–100)
SODIUM BLDA-SCNC: 135 MMOL/L — LOW (ref 138–146)
SODIUM SERPL-SCNC: 142 MMOL/L — SIGNIFICANT CHANGE UP (ref 135–145)
WBC # BLD: 4.3 K/UL — SIGNIFICANT CHANGE UP (ref 3.8–10.5)
WBC # BLD: 6.98 K/UL — SIGNIFICANT CHANGE UP (ref 3.8–10.5)
WBC # FLD AUTO: 4.3 K/UL — SIGNIFICANT CHANGE UP (ref 3.8–10.5)
WBC # FLD AUTO: 6.98 K/UL — SIGNIFICANT CHANGE UP (ref 3.8–10.5)

## 2019-08-27 PROCEDURE — 69990 MICROSURGERY ADD-ON: CPT

## 2019-08-27 PROCEDURE — 69990 MICROSURGERY ADD-ON: CPT | Mod: 80

## 2019-08-27 PROCEDURE — 99222 1ST HOSP IP/OBS MODERATE 55: CPT

## 2019-08-27 PROCEDURE — 72146 MRI CHEST SPINE W/O DYE: CPT | Mod: 26

## 2019-08-27 PROCEDURE — 63276 BX/EXC XDRL SPINE LESN THRC: CPT

## 2019-08-27 PROCEDURE — 99291 CRITICAL CARE FIRST HOUR: CPT | Mod: 24

## 2019-08-27 PROCEDURE — 63276 BX/EXC XDRL SPINE LESN THRC: CPT | Mod: 80

## 2019-08-27 RX ORDER — BUPIVACAINE 13.3 MG/ML
20 INJECTION, SUSPENSION, LIPOSOMAL INFILTRATION ONCE
Refills: 0 | Status: DISCONTINUED | OUTPATIENT
Start: 2019-08-27 | End: 2019-08-30

## 2019-08-27 RX ORDER — DEXTROSE 50 % IN WATER 50 %
15 SYRINGE (ML) INTRAVENOUS ONCE
Refills: 0 | Status: DISCONTINUED | OUTPATIENT
Start: 2019-08-27 | End: 2019-08-30

## 2019-08-27 RX ORDER — SODIUM CHLORIDE 9 MG/ML
1000 INJECTION, SOLUTION INTRAVENOUS
Refills: 0 | Status: DISCONTINUED | OUTPATIENT
Start: 2019-08-27 | End: 2019-08-30

## 2019-08-27 RX ORDER — ACETAMINOPHEN 500 MG
2 TABLET ORAL
Qty: 0 | Refills: 0 | DISCHARGE

## 2019-08-27 RX ORDER — AMLODIPINE BESYLATE 2.5 MG/1
1 TABLET ORAL
Qty: 0 | Refills: 0 | DISCHARGE

## 2019-08-27 RX ORDER — MAGNESIUM HYDROXIDE 400 MG/1
30 TABLET, CHEWABLE ORAL EVERY 12 HOURS
Refills: 0 | Status: DISCONTINUED | OUTPATIENT
Start: 2019-08-27 | End: 2019-08-30

## 2019-08-27 RX ORDER — CEFAZOLIN SODIUM 1 G
2000 VIAL (EA) INJECTION EVERY 8 HOURS
Refills: 0 | Status: DISCONTINUED | OUTPATIENT
Start: 2019-08-27 | End: 2019-08-27

## 2019-08-27 RX ORDER — OMEPRAZOLE 10 MG/1
1 CAPSULE, DELAYED RELEASE ORAL
Qty: 0 | Refills: 0 | DISCHARGE

## 2019-08-27 RX ORDER — ACETAMINOPHEN 500 MG
1000 TABLET ORAL ONCE
Refills: 0 | Status: COMPLETED | OUTPATIENT
Start: 2019-08-28 | End: 2019-08-28

## 2019-08-27 RX ORDER — PANTOPRAZOLE SODIUM 20 MG/1
40 TABLET, DELAYED RELEASE ORAL
Refills: 0 | Status: DISCONTINUED | OUTPATIENT
Start: 2019-08-27 | End: 2019-08-30

## 2019-08-27 RX ORDER — MORPHINE SULFATE 50 MG/1
2 CAPSULE, EXTENDED RELEASE ORAL EVERY 4 HOURS
Refills: 0 | Status: DISCONTINUED | OUTPATIENT
Start: 2019-08-27 | End: 2019-08-27

## 2019-08-27 RX ORDER — HYDROMORPHONE HYDROCHLORIDE 2 MG/ML
0.5 INJECTION INTRAMUSCULAR; INTRAVENOUS; SUBCUTANEOUS
Refills: 0 | Status: DISCONTINUED | OUTPATIENT
Start: 2019-08-27 | End: 2019-08-29

## 2019-08-27 RX ORDER — DEXTROSE 50 % IN WATER 50 %
12.5 SYRINGE (ML) INTRAVENOUS ONCE
Refills: 0 | Status: DISCONTINUED | OUTPATIENT
Start: 2019-08-27 | End: 2019-08-30

## 2019-08-27 RX ORDER — ONDANSETRON 8 MG/1
4 TABLET, FILM COATED ORAL EVERY 6 HOURS
Refills: 0 | Status: DISCONTINUED | OUTPATIENT
Start: 2019-08-27 | End: 2019-08-30

## 2019-08-27 RX ORDER — METOPROLOL TARTRATE 50 MG
100 TABLET ORAL DAILY
Refills: 0 | Status: DISCONTINUED | OUTPATIENT
Start: 2019-08-27 | End: 2019-08-30

## 2019-08-27 RX ORDER — DEXTROSE 50 % IN WATER 50 %
25 SYRINGE (ML) INTRAVENOUS ONCE
Refills: 0 | Status: DISCONTINUED | OUTPATIENT
Start: 2019-08-27 | End: 2019-08-30

## 2019-08-27 RX ORDER — CYCLOBENZAPRINE HYDROCHLORIDE 10 MG/1
5 TABLET, FILM COATED ORAL THREE TIMES A DAY
Refills: 0 | Status: DISCONTINUED | OUTPATIENT
Start: 2019-08-27 | End: 2019-08-30

## 2019-08-27 RX ORDER — HYDROMORPHONE HYDROCHLORIDE 2 MG/ML
2 INJECTION INTRAMUSCULAR; INTRAVENOUS; SUBCUTANEOUS EVERY 6 HOURS
Refills: 0 | Status: DISCONTINUED | OUTPATIENT
Start: 2019-08-27 | End: 2019-08-30

## 2019-08-27 RX ORDER — SODIUM CHLORIDE 9 MG/ML
1000 INJECTION, SOLUTION INTRAVENOUS
Refills: 0 | Status: DISCONTINUED | OUTPATIENT
Start: 2019-08-27 | End: 2019-08-28

## 2019-08-27 RX ORDER — GLUCAGON INJECTION, SOLUTION 0.5 MG/.1ML
1 INJECTION, SOLUTION SUBCUTANEOUS ONCE
Refills: 0 | Status: DISCONTINUED | OUTPATIENT
Start: 2019-08-27 | End: 2019-08-30

## 2019-08-27 RX ORDER — METOPROLOL TARTRATE 50 MG
1 TABLET ORAL
Qty: 0 | Refills: 0 | DISCHARGE

## 2019-08-27 RX ORDER — OMEGA-3 ACID ETHYL ESTERS 1 G
1 CAPSULE ORAL
Qty: 0 | Refills: 0 | DISCHARGE

## 2019-08-27 RX ORDER — CEFAZOLIN SODIUM 1 G
2000 VIAL (EA) INJECTION EVERY 8 HOURS
Refills: 0 | Status: COMPLETED | OUTPATIENT
Start: 2019-08-27 | End: 2019-08-28

## 2019-08-27 RX ORDER — AMLODIPINE BESYLATE 2.5 MG/1
10 TABLET ORAL DAILY
Refills: 0 | Status: DISCONTINUED | OUTPATIENT
Start: 2019-08-27 | End: 2019-08-30

## 2019-08-27 RX ORDER — DOCUSATE SODIUM 100 MG
100 CAPSULE ORAL THREE TIMES A DAY
Refills: 0 | Status: DISCONTINUED | OUTPATIENT
Start: 2019-08-27 | End: 2019-08-30

## 2019-08-27 RX ORDER — HYDRALAZINE HCL 50 MG
5 TABLET ORAL
Refills: 0 | Status: COMPLETED | OUTPATIENT
Start: 2019-08-27 | End: 2019-08-28

## 2019-08-27 RX ORDER — ACETAMINOPHEN 500 MG
1000 TABLET ORAL ONCE
Refills: 0 | Status: COMPLETED | OUTPATIENT
Start: 2019-08-27 | End: 2019-08-27

## 2019-08-27 RX ORDER — ASPIRIN/CALCIUM CARB/MAGNESIUM 324 MG
1 TABLET ORAL
Qty: 0 | Refills: 0 | DISCHARGE

## 2019-08-27 RX ORDER — INSULIN LISPRO 100/ML
VIAL (ML) SUBCUTANEOUS
Refills: 0 | Status: DISCONTINUED | OUTPATIENT
Start: 2019-08-27 | End: 2019-08-30

## 2019-08-27 RX ADMIN — Medication 5 MILLIGRAM(S): at 15:11

## 2019-08-27 RX ADMIN — Medication 1 TABLET(S): at 16:29

## 2019-08-27 RX ADMIN — Medication 2000 MILLIGRAM(S): at 18:00

## 2019-08-27 RX ADMIN — Medication 400 MILLIGRAM(S): at 18:26

## 2019-08-27 RX ADMIN — Medication 4: at 21:14

## 2019-08-27 RX ADMIN — Medication 1000 MILLIGRAM(S): at 18:55

## 2019-08-27 RX ADMIN — CYCLOBENZAPRINE HYDROCHLORIDE 5 MILLIGRAM(S): 10 TABLET, FILM COATED ORAL at 21:15

## 2019-08-27 RX ADMIN — Medication 100 MILLIGRAM(S): at 16:29

## 2019-08-27 RX ADMIN — SODIUM CHLORIDE 80 MILLILITER(S): 9 INJECTION, SOLUTION INTRAVENOUS at 14:00

## 2019-08-27 RX ADMIN — Medication 100 MILLIGRAM(S): at 21:15

## 2019-08-27 NOTE — H&P PST ADULT - ADDITIONAL PE
AAOx3, FC PERRL EOMI face symmetric tongue midline upper and lower extremities 5/5 strength throughout sensation intact to light touch throughout   No Gunderson's sign or ankle clonus present unable to elicit patellar reflexes secondary to knee sx

## 2019-08-27 NOTE — H&P PST ADULT - NSICDXPASTMEDICALHX_GEN_ALL_CORE_FT
PAST MEDICAL HISTORY:  Diverticulitis     DM (diabetes mellitus) type 2/control with diet    Gastritis, erosive     HLD (hyperlipidemia)     HTN (hypertension)     Lumbar stenosis     OA (osteoarthritis)     Urine frequency Leakage

## 2019-08-27 NOTE — H&P PST ADULT - NSICDXPASTSURGICALHX_GEN_ALL_CORE_FT
PAST SURGICAL HISTORY:  H/O laparoscopy perforated pyloric ulcer    H/O total knee replacement, left 2010    History of bunionectomy of both great toes     History of total knee replacement, right 2016

## 2019-08-27 NOTE — H&P PST ADULT - NSICDXFAMILYHX_GEN_ALL_CORE_FT
FAMILY HISTORY:  Mother  Still living? Unknown  Family history of heart disease, Age at diagnosis: 51-60

## 2019-08-27 NOTE — CHART NOTE - NSCHARTNOTEFT_GEN_A_CORE
Neurosurgical Indications for Screening Dopplers on Admission:       1) Known hypercoagulation disorder (h/o VTE, thrombophilia, HIT, etc.)   2) Admitted from prolonged stay from another institution (straight forward ER transfers not included)  3) Presenting with significant leg immobility   4) Presenting with signs and symptoms of VTE?    5) With significant critical illness, Including "found down" for unknown period of time in HPI  6) With significant neurotrauma (TBI, SCI / TLS spine fractures)   7) Who are comatose   8) With known malignancy (e.g. glioblastoma multiforme, meningioma, etc.). Excludes IA chemo 23hr observation stays  9) On hemodialysis   10) Who have received platelet transfusion or antithrombotic reversal agents recently   11) Who have had recent major orthopedic surgery          Screening dopplers indicated?   Y x   N _    DVT Prophylaxis:  x SCD's   _ chemoprophylaxis

## 2019-08-27 NOTE — CONSULT NOTE ADULT - SUBJECTIVE AND OBJECTIVE BOX
HEALTH ISSUES - PROBLEM Dx:  Suspected deep vein thrombosis (DVT)          CHEMOTHERAPY REGIMEN:        Day:                          Diet:  Protocol:                                    IVF:      MEDICATIONS  (STANDING):  acetaminophen  IVPB .. 1000 milliGRAM(s) IV Intermittent once  amLODIPine   Tablet 10 milliGRAM(s) Oral daily  BUpivacaine liposome 1.3% Injectable (no eMAR) 20 milliLiter(s) Local Injection once  calcium carbonate 1250 mG  + Vitamin D (OsCal 500 + D) 1 Tablet(s) Oral daily  ceFAZolin  Injectable. 2000 milliGRAM(s) IV Push every 8 hours  cyclobenzaprine 5 milliGRAM(s) Oral three times a day  dextrose 5%. 1000 milliLiter(s) (50 mL/Hr) IV Continuous <Continuous>  dextrose 50% Injectable 12.5 Gram(s) IV Push once  dextrose 50% Injectable 25 Gram(s) IV Push once  dextrose 50% Injectable 25 Gram(s) IV Push once  docusate sodium 100 milliGRAM(s) Oral three times a day  insulin lispro (HumaLOG) corrective regimen sliding scale   SubCutaneous Before meals and at bedtime  lactated ringers. 1000 milliLiter(s) (80 mL/Hr) IV Continuous <Continuous>  metoprolol succinate  milliGRAM(s) Oral daily  pantoprazole    Tablet 40 milliGRAM(s) Oral before breakfast    MEDICATIONS  (PRN):  dextrose 40% Gel 15 Gram(s) Oral once PRN Blood Glucose LESS THAN 70 milliGRAM(s)/deciliter  glucagon  Injectable 1 milliGRAM(s) IntraMuscular once PRN Glucose LESS THAN 70 milligrams/deciliter  hydrALAZINE Injectable 5 milliGRAM(s) IV Push every 20 minutes PRN SBP > 150 mm Hg  HYDROmorphone   Tablet 2 milliGRAM(s) Oral every 6 hours PRN Moderate Pain (4 - 6)  HYDROmorphone  Injectable 0.5 milliGRAM(s) IV Push every 30 minutes PRN Severe Pain (7 - 10)  magnesium hydroxide Suspension 30 milliLiter(s) Oral every 12 hours PRN Constipation  ondansetron Injectable 4 milliGRAM(s) IV Push every 6 hours PRN Nausea      Allergies    oxycodone (Other)  Vicodin (Unknown)    Intolerances        DVT Prophylaxis: [ ] YES [ ] NO      Antibiotics: [ ] YES [ ] NO    Pain Scale (1-10):       Location:    Vital Signs Last 24 Hrs  T(C): 36.6 (27 Aug 2019 16:03), Max: 36.6 (27 Aug 2019 16:03)  T(F): 97.8 (27 Aug 2019 16:03), Max: 97.8 (27 Aug 2019 16:03)  HR: 76 (27 Aug 2019 17:38) (62 - 76)  BP: 135/81 (27 Aug 2019 17:38) (125/75 - 161/86)  BP(mean): 105 (27 Aug 2019 17:38) (92 - 117)  RR: 18 (27 Aug 2019 17:38) (10 - 25)  SpO2: 97% (27 Aug 2019 17:38) (94% - 98%)    Drug Dosing Weight  Height (cm): 162.56 (27 Aug 2019 06:37)  Weight (kg): 84.7 (27 Aug 2019 06:37)  BMI (kg/m2): 32.1 (27 Aug 2019 06:37)  BSA (m2): 1.9 (27 Aug 2019 06:37)     Physical Exam:  · Constitutional	detailed exam  · Constitutional Details	well-developed; well-groomed  · Eyes	EOMI; PERRL; no drainage or redness  · ENMT Comments	dry mucous membranes  · Respiratory	detailed exam  · Respiratory Comments	normal breath sounds at the lung bases bilaterally  · Cardiovascular	Regular rate & rhythm, normal S1, S2; no murmurs, gallops or rubs; no S3, S4  · Abd-Soft non tender  ·Ext-no edema, clubbing or cyanosis    URINARY CATHETER: [ ] YES [ ] NO     LABS:  CBC Full  -  ( 27 Aug 2019 14:17 )  WBC Count : 4.30 K/uL  RBC Count : 3.79 M/uL  Hemoglobin : 10.7 g/dL  Hematocrit : 34.1 %  Platelet Count - Automated : 155 K/uL  Mean Cell Volume : 90.0 fl  Mean Cell Hemoglobin : 28.2 pg  Mean Cell Hemoglobin Concentration : 31.4 gm/dL  Auto Neutrophil # : 2.98 K/uL  Auto Lymphocyte # : 1.11 K/uL  Auto Monocyte # : 0.13 K/uL  Auto Eosinophil # : 0.04 K/uL  Auto Basophil # : 0.01 K/uL  Auto Neutrophil % : 69.4 %  Auto Lymphocyte % : 25.8 %  Auto Monocyte % : 3.0 %  Auto Eosinophil % : 0.9 %  Auto Basophil % : 0.2 %    08-27    142  |  108  |  18  ----------------------------<  160<H>  3.9   |  25  |  0.74    Ca    8.8      27 Aug 2019 14:17  Phos  3.5     08-27  Mg     1.8     08-27      PT/INR - ( 27 Aug 2019 16:04 )   PT: 14.7 sec;   INR: 1.29          PTT - ( 27 Aug 2019 16:04 )  PTT:45.0 sec      CULTURES:    RADIOLOGY & ADDITIONAL STUDIES:

## 2019-08-27 NOTE — H&P PST ADULT - HISTORY OF PRESENT ILLNESS
This patient is a 74 year old woman w/ HTN, ?COPD (non smoker), DM2 not on meds, hx erosive gastritis on PPI, s/p b/l knee sx and lumbar laminectomy, found to have a thoracic spine mass  after presenting with pain around her ribs/bra line since April.    The pain is getting worse and now she feels wobbly when she walks. She also has pain/point tenderness in the right SI joint.  She does have some urinary frequency.    Immediately preop she had localizing fidicuals placed in MRI.

## 2019-08-27 NOTE — BRIEF OPERATIVE NOTE - OPERATION/FINDINGS
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 these were used in the OR to localize the tumor.

## 2019-08-27 NOTE — PROGRESS NOTE ADULT - SUBJECTIVE AND OBJECTIVE BOX
Post op Note    Dx: thoracic lami and tumor resection    74y    Female   s/p thoracic lumbar lami and tumor resection today with navigation. Frozen c/w small blue cell. Patient seen and examined at the bedside in PACU pending ICU transfer. Post op received Dilaudid for pain and hydralazine for SBP > 150. No new weakness, numbness, tingling. Denies any CP, SOB or difficulty breathing.       T(C): 35.9 (08-27-19 @ 13:33), Max: 35.9 (08-27-19 @ 13:33)  HR: 66 (08-27-19 @ 15:18) (62 - 72)  BP: 140/74 (08-27-19 @ 15:18) (140/74 - 161/86)  RR: 19 (08-27-19 @ 15:18) (15 - 25)  SpO2: 96% (08-27-19 @ 15:18) (94% - 97%)  Wt(kg): --      Physical exam  Awake, alert, oriented x 3, PERRL, EOMI  Follows commands, speech clear  MELENDREZ X4 with good strength   Incision: C/D/I

## 2019-08-27 NOTE — PROGRESS NOTE ADULT - SUBJECTIVE AND OBJECTIVE BOX
=================================  NEUROCRITICAL CARE ATTENDING NOTE  =================================    ONUR ADAMS   [id number]  Summary:  74y/F with Hypertension COPD Diabetes Mellitus gastritis s/p Knee sx, lumbarlaminectomy, rpesented with pain around ribs since April and gait difficulty.  Imaging revealed a thoracic spine mass.   Admitted for elective laminectomy / resection.    COURSE IN THE HOSPITAL:   Admitted to [facility], s/p OR    Past Medical History: Urine frequency Gastritis, erosive HLD (hyperlipidemia) OA (osteoarthritis) Diverticulitis DM (diabetes mellitus) Lumbar stenosis HTN (hypertension)  Allergies:  oxycodone (Other) Vicodin (Unknown)  Home meds:  amlodipine 10mg PO daily calcium + Vit D, ASA 81mg PO daily metoprolol 100mg PO daily omega 3 daily omeprazole 40 mg PO daily tylenol q6h     PHYSICAL EXAMINATION  T(C): 36.6 ( @ 16:03), Max: 36.6 ( @ 16:03) HR: 72 ( @ 17:02) (62 - 74) BP: 138/66 ( @ 17:02) (125/75 - 161/86) RR: 10 ( @ 17:02) (10 - 25) SpO2: 98% ( @ 17:02) (94% - 98%)  NEUROLOGIC EXAMINATION:  Patient is awake, alert, fully oriented, pupils 2-3mm equal and briskly reactive to light, EOMs intact, moving all 4s with good strength  GENERAL: not intubated, not in cardiorespiratory distress  EENT:  anicteric  CARDIOVASCULAR: (+) S1 S2, normal rate and regular rhythm  PULMONARY: clear to auscultation bilaterally  ABDOMEN: soft, nontender with normoactive bowel sounds  EXTREMITIES: no edema  SKIN: no rash    LABS:  CAPILLARY BLOOD GLUCOSE 111               10.7   4.30  )-----------( 155      ( 27 Aug 2019 14:17 )             34.1     142  |  108  |  18  ----------------------------<  160<H>  3.9   |  25  |  0.74    Ca    8.8      27 Aug 2019 14:17     @ 07:01  -   @ 17:19  IN: 470 mL / OUT: 1375 mL / NET: -905 mL    Bacteriology:  CSF studies:  EEG:  Neuroimagin/27 MRI thoracic: Posterior T4 bony and epidural mass, cord compression, ?atypical / aggressive hemangioma vs malignancy, metastasis  Other imaging:    MEDICATIONS: cefazolin 2g IV q8h cyclobenzaprine 5mg PO TID hydromorphone PRN amlodipine 10mg PO daily metoprolol 100mg PO daily docusate 100mg PO TID pantoprazole 40 daily  mod ISS calcium carbonate + vitamin D    IV FLUIDS: LR @ 80cc/hr  DRIPS:  DIET: advance as tolerated  Lines:  Drains:      Wounds:    CODE STATUS:  Full Code                       GOALS OF CARE:  aggressive                      DISPOSITION:  ICU

## 2019-08-28 LAB
ANION GAP SERPL CALC-SCNC: 8 MMOL/L — SIGNIFICANT CHANGE UP (ref 5–17)
APTT BLD: 33.6 SECS — SIGNIFICANT CHANGE UP (ref 27.5–37.4)
APTT BLD: 39.2 SEC — HIGH (ref 27.5–36.3)
BASOPHILS # BLD AUTO: 0.01 K/UL — SIGNIFICANT CHANGE UP (ref 0–0.2)
BASOPHILS NFR BLD AUTO: 0.1 % — SIGNIFICANT CHANGE UP (ref 0–2)
BUN SERPL-MCNC: 16 MG/DL — SIGNIFICANT CHANGE UP (ref 7–23)
CALCIUM SERPL-MCNC: 9.2 MG/DL — SIGNIFICANT CHANGE UP (ref 8.4–10.5)
CHLORIDE SERPL-SCNC: 106 MMOL/L — SIGNIFICANT CHANGE UP (ref 96–108)
CO2 SERPL-SCNC: 26 MMOL/L — SIGNIFICANT CHANGE UP (ref 22–31)
CREAT SERPL-MCNC: 0.87 MG/DL — SIGNIFICANT CHANGE UP (ref 0.5–1.3)
EOSINOPHIL # BLD AUTO: 0 K/UL — SIGNIFICANT CHANGE UP (ref 0–0.5)
EOSINOPHIL NFR BLD AUTO: 0 % — SIGNIFICANT CHANGE UP (ref 0–6)
FACT II INHIB PPP-ACNC: 104 % — SIGNIFICANT CHANGE UP (ref 74–142)
FACT V ACT/NOR PPP: 69 % — LOW (ref 70–143)
FACT VII ACT/NOR PPP: 85 % — SIGNIFICANT CHANGE UP (ref 53–149)
FACT X ACT/NOR PPP: 108 % — SIGNIFICANT CHANGE UP (ref 68–149)
GLUCOSE BLDC GLUCOMTR-MCNC: 136 MG/DL — HIGH (ref 70–99)
GLUCOSE BLDC GLUCOMTR-MCNC: 136 MG/DL — HIGH (ref 70–99)
GLUCOSE BLDC GLUCOMTR-MCNC: 144 MG/DL — HIGH (ref 70–99)
GLUCOSE SERPL-MCNC: 133 MG/DL — HIGH (ref 70–99)
HBA1C BLD-MCNC: 7 % — HIGH (ref 4–5.6)
HCT VFR BLD CALC: 32.9 % — LOW (ref 34.5–45)
HGB BLD-MCNC: 10.4 G/DL — LOW (ref 11.5–15.5)
IMM GRANULOCYTES NFR BLD AUTO: 0.3 % — SIGNIFICANT CHANGE UP (ref 0–1.5)
INR BLD: 1.28 — HIGH (ref 0.88–1.16)
LYMPHOCYTES # BLD AUTO: 0.92 K/UL — LOW (ref 1–3.3)
LYMPHOCYTES # BLD AUTO: 10.6 % — LOW (ref 13–44)
MCHC RBC-ENTMCNC: 28.3 PG — SIGNIFICANT CHANGE UP (ref 27–34)
MCHC RBC-ENTMCNC: 31.6 GM/DL — LOW (ref 32–36)
MCV RBC AUTO: 89.6 FL — SIGNIFICANT CHANGE UP (ref 80–100)
MONOCYTES # BLD AUTO: 0.52 K/UL — SIGNIFICANT CHANGE UP (ref 0–0.9)
MONOCYTES NFR BLD AUTO: 6 % — SIGNIFICANT CHANGE UP (ref 2–14)
NEUTROPHILS # BLD AUTO: 7.19 K/UL — SIGNIFICANT CHANGE UP (ref 1.8–7.4)
NEUTROPHILS NFR BLD AUTO: 83 % — HIGH (ref 43–77)
NRBC # BLD: 0 /100 WBCS — SIGNIFICANT CHANGE UP (ref 0–0)
PLATELET # BLD AUTO: 152 K/UL — SIGNIFICANT CHANGE UP (ref 150–400)
POTASSIUM SERPL-MCNC: 4.5 MMOL/L — SIGNIFICANT CHANGE UP (ref 3.5–5.3)
POTASSIUM SERPL-SCNC: 4.5 MMOL/L — SIGNIFICANT CHANGE UP (ref 3.5–5.3)
PROTHROM AB SERPL-ACNC: 14.6 SEC — HIGH (ref 10–12.9)
PT 50/50: 13.1 SECS — SIGNIFICANT CHANGE UP (ref 9.7–13.5)
RBC # BLD: 3.67 M/UL — LOW (ref 3.8–5.2)
RBC # FLD: 14.9 % — HIGH (ref 10.3–14.5)
SODIUM SERPL-SCNC: 140 MMOL/L — SIGNIFICANT CHANGE UP (ref 135–145)
THROMBIN TIME: 27 SEC — HIGH (ref 17.6–24)
WBC # BLD: 8.67 K/UL — SIGNIFICANT CHANGE UP (ref 3.8–10.5)
WBC # FLD AUTO: 8.67 K/UL — SIGNIFICANT CHANGE UP (ref 3.8–10.5)

## 2019-08-28 PROCEDURE — 99222 1ST HOSP IP/OBS MODERATE 55: CPT

## 2019-08-28 RX ORDER — ENOXAPARIN SODIUM 100 MG/ML
40 INJECTION SUBCUTANEOUS EVERY 24 HOURS
Refills: 0 | Status: DISCONTINUED | OUTPATIENT
Start: 2019-08-28 | End: 2019-08-30

## 2019-08-28 RX ADMIN — ENOXAPARIN SODIUM 40 MILLIGRAM(S): 100 INJECTION SUBCUTANEOUS at 21:25

## 2019-08-28 RX ADMIN — Medication 100 MILLIGRAM(S): at 15:59

## 2019-08-28 RX ADMIN — Medication 5 MILLIGRAM(S): at 00:23

## 2019-08-28 RX ADMIN — Medication 100 MILLIGRAM(S): at 21:25

## 2019-08-28 RX ADMIN — HYDROMORPHONE HYDROCHLORIDE 2 MILLIGRAM(S): 2 INJECTION INTRAMUSCULAR; INTRAVENOUS; SUBCUTANEOUS at 21:25

## 2019-08-28 RX ADMIN — AMLODIPINE BESYLATE 10 MILLIGRAM(S): 2.5 TABLET ORAL at 06:10

## 2019-08-28 RX ADMIN — HYDROMORPHONE HYDROCHLORIDE 0.5 MILLIGRAM(S): 2 INJECTION INTRAMUSCULAR; INTRAVENOUS; SUBCUTANEOUS at 12:02

## 2019-08-28 RX ADMIN — CYCLOBENZAPRINE HYDROCHLORIDE 5 MILLIGRAM(S): 10 TABLET, FILM COATED ORAL at 06:10

## 2019-08-28 RX ADMIN — HYDROMORPHONE HYDROCHLORIDE 2 MILLIGRAM(S): 2 INJECTION INTRAMUSCULAR; INTRAVENOUS; SUBCUTANEOUS at 21:55

## 2019-08-28 RX ADMIN — Medication 1 TABLET(S): at 12:33

## 2019-08-28 RX ADMIN — Medication 100 MILLIGRAM(S): at 06:11

## 2019-08-28 RX ADMIN — Medication 2000 MILLIGRAM(S): at 02:30

## 2019-08-28 RX ADMIN — PANTOPRAZOLE SODIUM 40 MILLIGRAM(S): 20 TABLET, DELAYED RELEASE ORAL at 06:11

## 2019-08-28 RX ADMIN — HYDROMORPHONE HYDROCHLORIDE 0.5 MILLIGRAM(S): 2 INJECTION INTRAMUSCULAR; INTRAVENOUS; SUBCUTANEOUS at 11:23

## 2019-08-28 RX ADMIN — CYCLOBENZAPRINE HYDROCHLORIDE 5 MILLIGRAM(S): 10 TABLET, FILM COATED ORAL at 21:25

## 2019-08-28 RX ADMIN — Medication 100 MILLIGRAM(S): at 06:10

## 2019-08-28 RX ADMIN — Medication 400 MILLIGRAM(S): at 00:24

## 2019-08-28 RX ADMIN — Medication 1000 MILLIGRAM(S): at 01:15

## 2019-08-28 RX ADMIN — CYCLOBENZAPRINE HYDROCHLORIDE 5 MILLIGRAM(S): 10 TABLET, FILM COATED ORAL at 15:59

## 2019-08-28 NOTE — PHYSICAL THERAPY INITIAL EVALUATION ADULT - ADDITIONAL COMMENTS
Patient reports she lives in elevator apt building with 4 LIS. Patient reports the person she lives with is away for at least the next week-month. Patient denies history of falls. Patient owns SC and has used SC for 3 weeks prior to admission secondary to increasing instability. Pt marilee other DME and HHA. Patient reports she lives in elevator apt building with 4 LIS. Patient reports the person she lives with is away for at least the next week-month. Patient denies history of falls. Patient owns SC and has used SC for 3 weeks prior to admission secondary to increasing instability. Pt denies other DME and HHA. Patient reports she lives in elevator apt building with 4 LIS. Patient reports the individual she lives with is away for at least the next week-month. Patient reports her daughter is coming to visit on Saturday but will not be staying with her. Patient denies history of falls. Patient owns SC and has used SC for 3 weeks prior to admission secondary to increasing instability. Pt denies other DME and HHA.

## 2019-08-28 NOTE — PHYSICAL THERAPY INITIAL EVALUATION ADULT - PERTINENT HX OF CURRENT PROBLEM, REHAB EVAL
66 yo F found to have thoracic spine mass after presenting with pain around ribs and bra line since April

## 2019-08-28 NOTE — OCCUPATIONAL THERAPY INITIAL EVALUATION ADULT - GENERAL OBSERVATIONS, REHAB EVAL
Patient right hand dominant. Chart reviewed, CHASITY Quevedo cleared patient for OT evaluation. Patient received seated in recliner, NAD, +IV heplock, +tele. Patient right hand dominant. Chart reviewed, COLLEEN Tracy and CHASITY Marcum cleared patient for OT evaluation. Patient received seated in recliner, NAD, +IV heplock, +tele.

## 2019-08-28 NOTE — PHYSICAL THERAPY INITIAL EVALUATION ADULT - CRITERIA FOR SKILLED THERAPEUTIC INTERVENTIONS
anticipated discharge recommendation/therapy frequency/predicted duration of therapy intervention/rehab potential/anticipated equipment needs at discharge/impairments found/functional limitations in following categories

## 2019-08-28 NOTE — OCCUPATIONAL THERAPY INITIAL EVALUATION ADULT - MD ORDER
74y/F with Hypertension COPD Diabetes Mellitus gastritis s/p Knee sx, lumbar laminectomy, presented with pain around ribs since April and gait difficulty.  Imaging revealed a thoracic spine mass.   Admitted for elective laminectomy / resection.

## 2019-08-28 NOTE — OCCUPATIONAL THERAPY INITIAL EVALUATION ADULT - RANGE OF MOTION EXAMINATION, UPPER EXTREMITY
bilateral UE Active ROM was WFL  (within functional limits)/Patient c/o pain with b/l shoulder end range

## 2019-08-28 NOTE — PROGRESS NOTE ADULT - SUBJECTIVE AND OBJECTIVE BOX
=================================  NEUROCRITICAL CARE ATTENDING NOTE  =================================    ONUR ADAMS   [id number]  Summary:  74y/F with Hypertension COPD Diabetes Mellitus gastritis s/p Knee sx, lumbarlaminectomy, rpesented with pain around ribs since April and gait difficulty.  Imaging revealed a thoracic spine mass.   Admitted for elective laminectomy / resection.    COURSE IN THE HOSPITAL:   Admitted to [facility], s/p OR   No significant events overnight.     Past Medical History: Urine frequency Gastritis, erosive HLD (hyperlipidemia) OA (osteoarthritis) Diverticulitis DM (diabetes mellitus) Lumbar stenosis HTN (hypertension)  Allergies:  oxycodone (Other) Vicodin (Unknown)  Home meds:  amlodipine 10mg PO daily calcium + Vit D, ASA 81mg PO daily metoprolol 100mg PO daily omega 3 daily omeprazole 40 mg PO daily tylenol q6h     PHYSICAL EXAMINATION  T(C): 36.2 ( @ 19:30), Max: 36.6 ( @ 16:03) HR: 60 ( @ 10:00) (56 - 78)BP: 115/59 ( @ 10:00) (115/59 - 161/86) RR: 23 ( @ 10:00) (10 - 28) SpO2: 91% ( @ 10:00) (91% - 99%)  NEUROLOGIC EXAMINATION:  Patient is awake, alert, fully oriented, pupils 2-3mm equal and briskly reactive to light, EOMs intact, moving all 4s with good strength, out of bed in chair  GENERAL: not intubated, not in cardiorespiratory distress  EENT:  anicteric  CARDIOVASCULAR: (+) S1 S2, normal rate and regular rhythm  PULMONARY: clear to auscultation bilaterally  ABDOMEN: soft, nontender with normoactive bowel sounds  EXTREMITIES: no edema  SKIN: no rash    LABS:  CAPILLARY BLOOD GLUCOSE 136 144 242               10.4   8.67  )-----------( 152      ( 28 Aug 2019 03:59 )             32.9     140  |  106  |  16  ----------------------------<  133<H>  4.5   |  26  |  0.87    Ca    9.2      28 Aug 2019 03:59  Phos  3.5       Mg     1.8      @ 07:01  -   @ 07:00  IN: 2240 mL / OUT: 2740 mL / NET: -500 mL    Bacteriology:  CSF studies:  EEG:  Neuroimagin/27 MRI thoracic: Posterior T4 bony and epidural mass, cord compression, ?atypical / aggressive hemangioma vs malignancy, metastasis  Other imaging:    MEDICATIONS: cefazolin 2g IV q8h cyclobenzaprine 5mg PO TID hydromorphone PRN amlodipine 10mg PO daily metoprolol 100mg PO daily docusate 100mg PO TID pantoprazole 40 daily  mod ISS calcium carbonate + vitamin D    IV FLUIDS: IVL  DRIPS:  DIET: CCD  Lines:  Drains:    Wounds:    CODE STATUS:  Full Code                       GOALS OF CARE:  aggressive                      DISPOSITION:  9Wo

## 2019-08-28 NOTE — OCCUPATIONAL THERAPY INITIAL EVALUATION ADULT - MANUAL MUSCLE TESTING RESULTS, REHAB EVAL
pain, however BUE at least 4/5 throughout as observed functionally against gravity and during mobility/grossly assessed due to

## 2019-08-28 NOTE — CONSULT NOTE ADULT - SUBJECTIVE AND OBJECTIVE BOX
[facility] [address]  [place] [address]               Jamal Iniguez   2017  4:15 PM   ED    Description:  Male : 2016   Department:  [facility]           Your Care was Coordinated By:     Provider Role From To    Naveen Daniel Jr., MD Attending Provider [phone] --    Ann Woodson PA-C Physician Assistant [phone] --      Reason for Visit     Fall           Diagnoses this Visit        Comments    Open fracture of tooth, initial encounter    -  Primary     Abrasion           ED Disposition     None           To Do List           Follow-up Information     Please follow up.    Why:  Dentist.  Resource sheet provided       [facility]     [facility] -  Assistance  Registered nurses in the [facility] provide clinical advisement, health education, appointment booking, and other advisory services.  Call for this free service at [phone].             Medications           Message regarding Medications     Verify the changes and/or additions to your medication regime listed below are the same as discussed with your clinician today.  If any of these changes or additions are incorrect, please notify your healthcare provider.             Verify that the below list of medications is an accurate representation of the medications you are currently taking.  If none reported, the list may be blank. If incorrect, please contact your healthcare provider. Carry this list with you in case of emergency.                Clinical Reference Information           Your Vitals Were     Pulse Temp Resp Weight SpO2       130 96.2 °F (35.7 °C) (Tympanic) 30 13 kg (28 lb 10.6 oz) 100%       Allergies as of 2017     No Known Allergies      Immunizations Administered on Date of Encounter - 2017     None      ED Micro, Lab, POCT     None      ED Imaging Orders     None        Discharge Instructions          Dental Trauma (Child)  A blow to the mouth can cause damage to the teeth. This is called dental trauma. Teeth may become loose, fall out, or break. Teeth may also be pushed up into the socket. The tissues inside the mouth might be injured. In rare cases, the jaw is injured. A dental trauma can cause a lot of bleeding, pain, and swelling. Baby teeth that are injured or fall out are not put back in place. A permanent tooth that has been knocked out will be replaced in the socket if possible.  A dental trauma can be frightening. It may cause a lot of bleeding, pain, and swelling. A dental trauma must be treated as soon as possible. Put a broken or knocked-out tooth in a small container of milk. Don't use tap water. Water can harm the tooth within a short period of time. You can also use a special tooth-saving solution that you may have in your home first-aid kit. Take the container with you to the ED.     Home care  Your childs health care provider may prescribe medications for pain and to prevent infection. Follow all instructions for giving these medications to your child. If your child is given an antibiotic, make sure to give all the medication for the full number of days until it is gone. Keep giving the medication even if your child has no symptoms.  General care:  · Apply a cold pack or ice compress for up to 20 minutes several times a day. This is to help reduce pain and relieve swelling. Cover it with a thin, dry cloth before putting it on your childs skin.  · Serve your child soft foods until he or she feel better. It may be difficult for him or her to chew hard foods for a few days.  · Watch your child for signs of infection (see below).  Special note to parents  To help prevent dental injuries, follow the instructions below.  Babies ages 0 to 11 months:  · Dont use baby walkers, or use them with care. They can cause falls resulting in dental trauma.  Children ages 11 months and up:  · Teach your  child to avoid pushing other children when playing.  · Make sure your child wears a helmet and mouthguard when playing sports, riding a bike, skateboarding, or roller skating.  · Teach your child to use the ladder when getting out of a swimming pool.  · Dont allow your child to jump off a moving swing.  · Teach your child to be careful of his or her teeth and his or her friends teeth when playing.  Follow-up care  Follow up with your childs health care provider.  When to seek medical advice  Call your child's health care provider right away if any of these occur:  · Fever of 100.4°F (38°C) or higher  · Aching pain in a tooth  · A tooth that is sensitive to cold  · Headache, dizziness, or confusion  · Redness or swelling around the tooth  · Pain that gets worse  · Fluid leaking from the area around the tooth  © 5711-5951 [employer]. [address] [place] [address]. All rights reserved. This information is not intended as a substitute for professional medical care. Always follow your healthcare professional's instructions.           [facility] complies with applicable Federal civil rights laws and does not discriminate on the basis of race, color, national origin, age, disability, or sex.        Language Assistance Services     ATTENTION: Language assistance services are available, free of charge. Please call [phone].      ATENCIÓN: Si jaimeela ortiz, tiene a pascual disposición servicios gratuitos de asistencia lingüística. Llame al [phone].     CHÚ Ý: N?u b?n nói Ti?ng Vi?t, có các d?ch v? h? tr? ngôn ng? mi?n phí dành cho b?n. G?i s? [phone].         Patient is a 74y old  Female who presents with a chief complaint of spine surgery (27 Aug 2019 18:00)        HPI:  This patient is a 74 year old woman w/ HTN, ?COPD (non smoker), DM2 not on meds, hx erosive gastritis on PPI, s/p b/l knee sx and lumbar laminectomy, found to have a thoracic spine mass  after presenting with pain around her ribs/bra line since April.    The pain is getting worse and now she feels wobbly when she walks. She also has pain/point tenderness in the right SI joint.  She does have some urinary frequency.    Immediately preop she had localizing fidicuals placed in MRI. (27 Aug 2019 09:34)    Months of chest pain and found to have a thoracic mass - reports no radicular symptoms in the LE   Allergies  oxycodone (Other)  Vicodin (Unknown)      Health Issues  R22.2  Family history of heart disease (Mother)  Urine frequency  Gastritis, erosive  HLD (hyperlipidemia)  OA (osteoarthritis)  Diverticulitis  DM (diabetes mellitus)  Lumbar stenosis  HTN (hypertension)  Sarcoma of paraspinal region  Suspected deep vein thrombosis (DVT)  History of total knee replacement, right  H/O laparoscopy  History of bunionectomy of both great toes  H/O total knee replacement, left        FAMILY HISTORY:  Family history of heart disease (Mother)      MEDICATIONS  (STANDING):  amLODIPine   Tablet 10 milliGRAM(s) Oral daily  BUpivacaine liposome 1.3% Injectable (no eMAR) 20 milliLiter(s) Local Injection once  calcium carbonate 1250 mG  + Vitamin D (OsCal 500 + D) 1 Tablet(s) Oral daily  cyclobenzaprine 5 milliGRAM(s) Oral three times a day  dextrose 5%. 1000 milliLiter(s) (50 mL/Hr) IV Continuous <Continuous>  dextrose 50% Injectable 12.5 Gram(s) IV Push once  dextrose 50% Injectable 25 Gram(s) IV Push once  dextrose 50% Injectable 25 Gram(s) IV Push once  docusate sodium 100 milliGRAM(s) Oral three times a day  insulin lispro (HumaLOG) corrective regimen sliding scale   SubCutaneous Before meals and at bedtime  metoprolol succinate  milliGRAM(s) Oral daily  pantoprazole    Tablet 40 milliGRAM(s) Oral before breakfast    MEDICATIONS  (PRN):  dextrose 40% Gel 15 Gram(s) Oral once PRN Blood Glucose LESS THAN 70 milliGRAM(s)/deciliter  glucagon  Injectable 1 milliGRAM(s) IntraMuscular once PRN Glucose LESS THAN 70 milligrams/deciliter  HYDROmorphone   Tablet 2 milliGRAM(s) Oral every 6 hours PRN Moderate Pain (4 - 6)  HYDROmorphone  Injectable 0.5 milliGRAM(s) IV Push every 30 minutes PRN Severe Pain (7 - 10)  magnesium hydroxide Suspension 30 milliLiter(s) Oral every 12 hours PRN Constipation  ondansetron Injectable 4 milliGRAM(s) IV Push every 6 hours PRN Nausea      PAST MEDICAL & SURGICAL HISTORY:  Urine frequency: Leakage  Gastritis, erosive  HLD (hyperlipidemia)  OA (osteoarthritis)  Diverticulitis  DM (diabetes mellitus): type 2/control with diet  Lumbar stenosis  HTN (hypertension)  History of total knee replacement, right: 2016  H/O laparoscopy: perforated pyloric ulcer  History of bunionectomy of both great toes  H/O total knee replacement, left: 2010      Labs                          10.4   8.67  )-----------( 152      ( 28 Aug 2019 03:59 )             32.9     08-28    140  |  106  |  16  ----------------------------<  133<H>  4.5   |  26  |  0.87    Ca    9.2      28 Aug 2019 03:59  Phos  3.5     08-27  Mg     1.8     08-27        Radiology:    Physical Exam    MENTAL STATUS  -Level of Consciousness- awake    Orientation- person, place time  Language- aphasia/ dysarthria- nl  Memory- recent and remote- nl      Cranial Nerve 1- 12  Pupils- equal and reactive  Eye movements- full  Facial - no asymmetry   Lower CN-nl    Gait and Station- n/a    MOTOR  Upper-nl  Lower- no foot drop    Reflexes- decreased    Sensation- no sensory level    Cerebellar- no tremors    vascular - no bruits    Assessment- Thoracic mass     Plan  await pathology - no current myelopathy

## 2019-08-28 NOTE — OCCUPATIONAL THERAPY INITIAL EVALUATION ADULT - VISUAL ACUITY
Patient wears glasses for reading, not worn during eval. Patient denies diplopia or blurry vision, able to correctly read clock ~10ft away.

## 2019-08-28 NOTE — PHYSICAL THERAPY INITIAL EVALUATION ADULT - PHYSICAL ASSIST/NONPHYSICAL ASSIST: STAND/SIT, REHAB EVAL
1 person assist/verbal cues/Verbal cues to step back until calves touch bed, reach back with hands and lower self slow and controlled

## 2019-08-28 NOTE — PHYSICAL THERAPY INITIAL EVALUATION ADULT - MODALITIES TREATMENT COMMENTS
FIM: 2 | Instructed patient in spinal precautions and log roll technique. Patient verbalized understanding.

## 2019-08-28 NOTE — OCCUPATIONAL THERAPY INITIAL EVALUATION ADULT - ADDITIONAL COMMENTS
Patient reports independence with ADL/IADLs PTA, uses a SC for mobility, and has grab bars in shower. Patient lives alone but her daughter will be arriving on Saturday to assist during recovery.

## 2019-08-28 NOTE — PROGRESS NOTE ADULT - SUBJECTIVE AND OBJECTIVE BOX
S/Overnight events:   This patient is a 74 year old woman w/ HTN, ?COPD (non smoker), DM2 not on meds, hx erosive gastritis on PPI, s/p b/l knee sx and lumbar laminectomy, found to have a thoracic spine mass after presenting with pain around her ribs/bra line since April.  The pain was getting worse and now she feels wobbly when she walks. She also has pain/point tenderness in the right SI joint. She had some urinary frequency preop.    8/28/19 POD#1 doing well, no overnight events.  Without complaints.  Eating, sitting up in chair.  Denies N/V/SOB/chest pain.    Vital Signs Last 24 Hrs  T(C): 36.2 (27 Aug 2019 19:30), Max: 36.6 (27 Aug 2019 16:03)  T(F): 97.1 (27 Aug 2019 19:30), Max: 97.8 (27 Aug 2019 16:03)  HR: 62 (28 Aug 2019 09:20) (56 - 78)  BP: 118/65 (28 Aug 2019 09:20) (115/64 - 161/86)  BP(mean): 82 (28 Aug 2019 09:20) (82 - 117)  RR: 28 (28 Aug 2019 09:20) (10 - 28)  SpO2: 97% (28 Aug 2019 09:20) (94% - 99%)    I&O's Summary    27 Aug 2019 07:01  -  28 Aug 2019 07:00  --------------------------------------------------------  IN: 2240 mL / OUT: 2740 mL / NET: -500 mL    28 Aug 2019 07:01  -  28 Aug 2019 10:52  --------------------------------------------------------  IN: 80 mL / OUT: 200 mL / NET: -120 mL    PHYSICAL EXAM:  Neurological:    Motor exam:         [x] Upper extremity              Bi(c5)  WE(c6)  EE(c7)   FF(c8)                                                R         5/5        5/5        5/5       5/5                                               L          5/5        5/5        5/5       5/5         [x] Lower extremeity          HF(l2)   KE(l3)    TA(l4)   EHL(l5)  GS(s1)                                                 R        5/5        5/5        5/5       5/5         5/5                                               L         5/5        5/5       5/5       5/5          5/5                                                        [x] warm well perfused; capillary refill <3 seconds     Sensation: [x] intact to light touch  [] decreased  Incision with dressing clean, dry, intact.    Cardiovascular: RRR.  Respiratory: CTA B/L  Gastrointestinal:  Genitourinary:  Extremities:    Incision/Wound:    DEVICE/DRAIN DRESSING:    TUBES/LINES:  [] CVC  [] A-line  [] Lumbar Drain  [] Ventriculostomy  [] Other    DIET:  [] NPO  [] Mechanical  [] Tube feeds    LABS:                        10.4   8.67  )-----------( 152      ( 28 Aug 2019 03:59 )             32.9     08-28    140  |  106  |  16  ----------------------------<  133<H>  4.5   |  26  |  0.87    Ca    9.2      28 Aug 2019 03:59  Phos  3.5     08-27  Mg     1.8     08-27      PT/INR - ( 28 Aug 2019 09:40 )   PT: 14.6 sec;   INR: 1.28          PTT - ( 28 Aug 2019 09:40 )  PTT:39.2 sec        CAPILLARY BLOOD GLUCOSE      POCT Blood Glucose.: 144 mg/dL (28 Aug 2019 06:55)  POCT Blood Glucose.: 242 mg/dL (27 Aug 2019 21:05)      Drug Levels: [] N/A    CSF Analysis: [] N/A      Allergies    oxycodone (Other)  Vicodin (Unknown)    Intolerances      MEDICATIONS:  Antibiotics:    Neuro:  cyclobenzaprine 5 milliGRAM(s) Oral three times a day  HYDROmorphone   Tablet 2 milliGRAM(s) Oral every 6 hours PRN  HYDROmorphone  Injectable 0.5 milliGRAM(s) IV Push every 30 minutes PRN  ondansetron Injectable 4 milliGRAM(s) IV Push every 6 hours PRN    Anticoagulation:    OTHER:  amLODIPine   Tablet 10 milliGRAM(s) Oral daily  BUpivacaine liposome 1.3% Injectable (no eMAR) 20 milliLiter(s) Local Injection once  dextrose 40% Gel 15 Gram(s) Oral once PRN  dextrose 50% Injectable 12.5 Gram(s) IV Push once  dextrose 50% Injectable 25 Gram(s) IV Push once  dextrose 50% Injectable 25 Gram(s) IV Push once  docusate sodium 100 milliGRAM(s) Oral three times a day  glucagon  Injectable 1 milliGRAM(s) IntraMuscular once PRN  insulin lispro (HumaLOG) corrective regimen sliding scale   SubCutaneous Before meals and at bedtime  magnesium hydroxide Suspension 30 milliLiter(s) Oral every 12 hours PRN  metoprolol succinate  milliGRAM(s) Oral daily  pantoprazole    Tablet 40 milliGRAM(s) Oral before breakfast    IVF:  calcium carbonate 1250 mG  + Vitamin D (OsCal 500 + D) 1 Tablet(s) Oral daily  dextrose 5%. 1000 milliLiter(s) IV Continuous <Continuous>    CULTURES:    RADIOLOGY & ADDITIONAL TESTS:      ASSESSMENT:  74y Female s/p    R22.2  Family history of heart disease (Mother)  Handoff  Urine frequency  Gastritis, erosive  HLD (hyperlipidemia)  OA (osteoarthritis)  Diverticulitis  DM (diabetes mellitus)  Lumbar stenosis  HTN (hypertension)  Sarcoma of paraspinal region  Suspected deep vein thrombosis (DVT)  History of total knee replacement, right  H/O laparoscopy  History of bunionectomy of both great toes  H/O total knee replacement, left      PLAN:  NEURO:    CARDIOVASCULAR:    PULMONARY:    RENAL:    GI:    HEME:    ID:    ENDO:    DVT PROPHYLAXIS:  [] Venodynes                                [] Heparin/Lovenox    FALL RISK:  [] Low Risk                                    [] Impulsive    DISPOSITION:       Assessment:  Present when checked    []  GCS  E   V  M     Heart Failure: []Acute, [] acute on chronic , []chronic  Heart Failure:  [] Diastolic (HFpEF), [] Systolic (HFrEF), []Combined (HFpEF and HFrEF), [] RHF, [] Pulm HTN, [] Other    [] LULU, [] ATN, [] AIN, [] other  [] CKD1, [] CKD2, [] CKD 3, [] CKD 4, [] CKD 5, []ESRD    Encephalopathy: [] Metabolic, [] Hepatic, [] toxic, [] Neurological, [] Other    Abnormal Nurtitional Status: [] malnurtition (see nutrition note), [ ]underweight: BMI < 19, [] morbid obesity: BMI >40, [] Cachexia    [] Sepsis  [] hypovolemic shock,[] cardiogenic shock, [] hemorrhagic shock, [] neuogenic shock  [] Acute Respiratory Failure  []Cerebral edema, [] Brain compression/ herniation,   [] Functional quadriplegia  [] Acute blood loss anemia S/Overnight events:   This patient is a 74 year old woman w/ HTN, ?COPD (non smoker), DM2 not on meds, hx erosive gastritis on PPI, s/p b/l knee sx and lumbar laminectomy, found to have a thoracic spine mass after presenting with pain around her ribs/bra line since April.  The pain was getting worse and now she feels wobbly when she walks. She also has pain/point tenderness in the right SI joint. She had some urinary frequency preop.    8/27/19 OR thoracic lami/tumor resection, frozen: small blue cell tumor  8/28/19 POD#1 doing well, no overnight events.  Without complaints.  Eating, sitting up in chair.  Denies N/V/SOB/chest pain.    Vital Signs Last 24 Hrs  T(C): 36.2 (27 Aug 2019 19:30), Max: 36.6 (27 Aug 2019 16:03)  T(F): 97.1 (27 Aug 2019 19:30), Max: 97.8 (27 Aug 2019 16:03)  HR: 62 (28 Aug 2019 09:20) (56 - 78)  BP: 118/65 (28 Aug 2019 09:20) (115/64 - 161/86)  BP(mean): 82 (28 Aug 2019 09:20) (82 - 117)  RR: 28 (28 Aug 2019 09:20) (10 - 28)  SpO2: 97% (28 Aug 2019 09:20) (94% - 99%)    I&O's Summary    27 Aug 2019 07:01  -  28 Aug 2019 07:00  --------------------------------------------------------  IN: 2240 mL / OUT: 2740 mL / NET: -500 mL    28 Aug 2019 07:01  -  28 Aug 2019 10:52  --------------------------------------------------------  IN: 80 mL / OUT: 200 mL / NET: -120 mL    PHYSICAL EXAM:  Neurological:    Motor exam:         [x] Upper extremity              Bi(c5)  WE(c6)  EE(c7)   FF(c8)                                                R         5/5        5/5        5/5       5/5                                               L          5/5        5/5        5/5       5/5         [x] Lower extremeity          HF(l2)   KE(l3)    TA(l4)   EHL(l5)  GS(s1)                                                 R        5/5        5/5        5/5       5/5         5/5                                               L         5/5        5/5       5/5       5/5          5/5                                                        [x] warm well perfused; capillary refill <3 seconds     Sensation: [x] intact to light touch  [] decreased  Incision with dressing clean, dry, intact.    Cardiovascular: RRR.  Respiratory: CTA B/L.  Gastrointestinal: Soft, NT/ND. +BS.  Genitourinary: bajwa  Extremities: Calves soft, nontender, no edema.    DEVICE/DRAIN DRESSING: No drains.  Dressing c/d/i.    TUBES/LINES:  [] CVC  [x] A-line  [] Lumbar Drain  [] Ventriculostomy  [] Other    DIET: Diabetic regular  [] NPO  [] Mechanical  [] Tube feeds    LABS:                        10.4   8.67  )-----------( 152      ( 28 Aug 2019 03:59 )             32.9     08-28    140  |  106  |  16  ----------------------------<  133<H>  4.5   |  26  |  0.87    Ca    9.2      28 Aug 2019 03:59  Phos  3.5     08-27  Mg     1.8     08-27    PT/INR - ( 28 Aug 2019 09:40 )   PT: 14.6 sec;   INR: 1.28       PTT - ( 28 Aug 2019 09:40 )  PTT:39.2 sec    CAPILLARY BLOOD GLUCOSE  POCT Blood Glucose.: 144 mg/dL (28 Aug 2019 06:55)  POCT Blood Glucose.: 242 mg/dL (27 Aug 2019 21:05)    Drug Levels: [x] N/A    CSF Analysis: [x] N/A    Allergies  oxycodone (Other)  Vicodin (Unknown)    Intolerances: none      MEDICATIONS:  MEDICATIONS  (STANDING):  amLODIPine   Tablet 10 milliGRAM(s) Oral daily  calcium carbonate 1250 mG  + Vitamin D (OsCal 500 + D) 1 Tablet(s) Oral daily  cyclobenzaprine 5 milliGRAM(s) Oral three times a day  docusate sodium 100 milliGRAM(s) Oral three times a day  insulin lispro (HumaLOG) corrective regimen sliding scale   SubCutaneous Before meals and at bedtime  metoprolol succinate  milliGRAM(s) Oral daily  pantoprazole    Tablet 40 milliGRAM(s) Oral before breakfast    MEDICATIONS  (PRN):  dextrose 40% Gel 15 Gram(s) Oral once PRN Blood Glucose LESS THAN 70 milliGRAM(s)/deciliter  glucagon  Injectable 1 milliGRAM(s) IntraMuscular once PRN Glucose LESS THAN 70 milligrams/deciliter  HYDROmorphone   Tablet 2 milliGRAM(s) Oral every 6 hours PRN Moderate Pain (4 - 6)  HYDROmorphone  Injectable 0.5 milliGRAM(s) IV Push every 30 minutes PRN Severe Pain (7 - 10)  magnesium hydroxide Suspension 30 milliLiter(s) Oral every 12 hours PRN Constipation  ondansetron Injectable 4 milliGRAM(s) IV Push every 6 hours PRN Nausea    Antibiotics: None    Anticoagulation: None    ASSESSMENT:  74y Female s/p thoracic laminectomy for tumor resection, frozen small blue cell tumor prelim.    R22.2  Family history of heart disease (Mother)  Handoff  Urine frequency  Gastritis, erosive  HLD (hyperlipidemia)  OA (osteoarthritis)  Diverticulitis  DM (diabetes mellitus)  Lumbar stenosis  HTN (hypertension)  Sarcoma of paraspinal region  Suspected deep vein thrombosis (DVT)  History of total knee replacement, right  H/O laparoscopy  History of bunionectomy of both great toes  H/O total knee replacement, left      PLAN:  NEURO: Intact postop, plan for ambulation/PT/OT eval.  Transfer to floor today.    CARDIOVASCULAR: No issues    PULMONARY: No issues    RENAL: No issues    GI: No issues.  Continue protonix, hx perforated ulcer/gastritis.    HEME: Appreciate onc consult.  Await radiation input.  Await final path.    ID: No issues.  D/C bajwa and a-line today.    ENDO: Continue insulin ss today, reassess need for coverage/home meds.    DVT PROPHYLAXIS:  [x] Venodynes                                [] Heparin/Lovenox    FALL RISK:  [] Low Risk                                    [] Impulsive    DISPOSITION: Transfer to floor today.  All the above d/w Dr. Nieto and Dr. Bearden.      Assessment:  Present when checked    []  GCS  E   V  M     Heart Failure: []Acute, [] acute on chronic , []chronic  Heart Failure:  [] Diastolic (HFpEF), [] Systolic (HFrEF), []Combined (HFpEF and HFrEF), [] RHF, [] Pulm HTN, [] Other    [] LULU, [] ATN, [] AIN, [] other  [] CKD1, [] CKD2, [] CKD 3, [] CKD 4, [] CKD 5, []ESRD    Encephalopathy: [] Metabolic, [] Hepatic, [] toxic, [] Neurological, [] Other    Abnormal Nurtitional Status: [] malnurtition (see nutrition note), [ ]underweight: BMI < 19, [] morbid obesity: BMI >40, [] Cachexia    [] Sepsis  [] hypovolemic shock,[] cardiogenic shock, [] hemorrhagic shock, [] neuogenic shock  [] Acute Respiratory Failure  []Cerebral edema, [] Brain compression/ herniation,   [] Functional quadriplegia  [] Acute blood loss anemia S/Overnight events:   This patient is a 74 year old woman w/ HTN, ?COPD (non smoker), DM2 not on meds, hx erosive gastritis on PPI, s/p b/l knee sx and lumbar laminectomy, found to have a thoracic spine mass after presenting with pain around her ribs/bra line since April.  The pain was getting worse and now she feels wobbly when she walks. She also has pain/point tenderness in the right SI joint. She had some urinary frequency preop.    8/27/19 OR thoracic lami/tumor resection, frozen: small blue cell tumor  8/28/19 POD#1 doing well, no overnight events.  Without complaints.  Eating, sitting up in chair.  Denies N/V/SOB/chest pain.    Vital Signs Last 24 Hrs  T(C): 36.2 (27 Aug 2019 19:30), Max: 36.6 (27 Aug 2019 16:03)  T(F): 97.1 (27 Aug 2019 19:30), Max: 97.8 (27 Aug 2019 16:03)  HR: 62 (28 Aug 2019 09:20) (56 - 78)  BP: 118/65 (28 Aug 2019 09:20) (115/64 - 161/86)  BP(mean): 82 (28 Aug 2019 09:20) (82 - 117)  RR: 28 (28 Aug 2019 09:20) (10 - 28)  SpO2: 97% (28 Aug 2019 09:20) (94% - 99%)    I&O's Summary    27 Aug 2019 07:01  -  28 Aug 2019 07:00  --------------------------------------------------------  IN: 2240 mL / OUT: 2740 mL / NET: -500 mL    28 Aug 2019 07:01  -  28 Aug 2019 10:52  --------------------------------------------------------  IN: 80 mL / OUT: 200 mL / NET: -120 mL    PHYSICAL EXAM:  Neurological:    Motor exam:         [x] Upper extremity              Bi(c5)  WE(c6)  EE(c7)   FF(c8)                                                R         5/5        5/5        5/5       5/5                                               L          5/5        5/5        5/5       5/5         [x] Lower extremeity          HF(l2)   KE(l3)    TA(l4)   EHL(l5)  GS(s1)                                                 R        5/5        5/5        5/5       5/5         5/5                                               L         5/5        5/5       5/5       5/5          5/5                                                        [x] warm well perfused; capillary refill <3 seconds     Sensation: [x] intact to light touch  [] decreased  Incision with dressing clean, dry, intact.    Cardiovascular: RRR.  Respiratory: CTA B/L.  Gastrointestinal: Soft, NT/ND. +BS.  Genitourinary: bajwa  Extremities: Calves soft, nontender, no edema.    DEVICE/DRAIN DRESSING: No drains.  Dressing c/d/i.    TUBES/LINES:  [] CVC  [x] A-line  [] Lumbar Drain  [] Ventriculostomy  [] Other    DIET: Diabetic regular  [] NPO  [] Mechanical  [] Tube feeds    LABS:                        10.4   8.67  )-----------( 152      ( 28 Aug 2019 03:59 )             32.9     08-28    140  |  106  |  16  ----------------------------<  133<H>  4.5   |  26  |  0.87    Ca    9.2      28 Aug 2019 03:59  Phos  3.5     08-27  Mg     1.8     08-27    PT/INR - ( 28 Aug 2019 09:40 )   PT: 14.6 sec;   INR: 1.28       PTT - ( 28 Aug 2019 09:40 )  PTT:39.2 sec    CAPILLARY BLOOD GLUCOSE  POCT Blood Glucose.: 144 mg/dL (28 Aug 2019 06:55)  POCT Blood Glucose.: 242 mg/dL (27 Aug 2019 21:05)    Drug Levels: [x] N/A    CSF Analysis: [x] N/A    Allergies  oxycodone (Other)  Vicodin (Unknown)    Intolerances: none      MEDICATIONS:  MEDICATIONS  (STANDING):  amLODIPine   Tablet 10 milliGRAM(s) Oral daily  calcium carbonate 1250 mG  + Vitamin D (OsCal 500 + D) 1 Tablet(s) Oral daily  cyclobenzaprine 5 milliGRAM(s) Oral three times a day  docusate sodium 100 milliGRAM(s) Oral three times a day  insulin lispro (HumaLOG) corrective regimen sliding scale   SubCutaneous Before meals and at bedtime  metoprolol succinate  milliGRAM(s) Oral daily  pantoprazole    Tablet 40 milliGRAM(s) Oral before breakfast    MEDICATIONS  (PRN):  dextrose 40% Gel 15 Gram(s) Oral once PRN Blood Glucose LESS THAN 70 milliGRAM(s)/deciliter  glucagon  Injectable 1 milliGRAM(s) IntraMuscular once PRN Glucose LESS THAN 70 milligrams/deciliter  HYDROmorphone   Tablet 2 milliGRAM(s) Oral every 6 hours PRN Moderate Pain (4 - 6)  HYDROmorphone  Injectable 0.5 milliGRAM(s) IV Push every 30 minutes PRN Severe Pain (7 - 10)  magnesium hydroxide Suspension 30 milliLiter(s) Oral every 12 hours PRN Constipation  ondansetron Injectable 4 milliGRAM(s) IV Push every 6 hours PRN Nausea    Antibiotics: None    Anticoagulation: None    ASSESSMENT:  74y Female s/p thoracic laminectomy for tumor resection, frozen small blue cell tumor prelim.    R22.2  Family history of heart disease (Mother)  Handoff  Urine frequency  Gastritis, erosive  HLD (hyperlipidemia)  OA (osteoarthritis)  Diverticulitis  DM (diabetes mellitus)  Lumbar stenosis  HTN (hypertension)  Sarcoma of paraspinal region  Suspected deep vein thrombosis (DVT)  History of total knee replacement, right  H/O laparoscopy  History of bunionectomy of both great toes  H/O total knee replacement, left      PLAN:  NEURO: Intact postop, plan for ambulation/PT/OT eval.  Transfer to floor today.    CARDIOVASCULAR: No issues    PULMONARY: No issues    RENAL: No issues    GI: No issues.  Continue protonix, hx perforated ulcer/gastritis.    HEME: Appreciate onc consult.  Await radiation input.  Await final path.    DVT Prophylaxis start lovenox today is okay per Dr. Bearden.  SCD's.    ID: No issues.  D/C bajwa and a-line today.    ENDO: Continue insulin ss today, reassess need for coverage/home meds.    DVT PROPHYLAXIS:  [x] Venodynes                                [] Heparin/Lovenox    FALL RISK:  [] Low Risk                                    [] Impulsive    DISPOSITION: Transfer to floor today.  All the above d/w Dr. Nieto and Dr. Bearden.      Assessment:  Present when checked    []  GCS  E   V  M     Heart Failure: []Acute, [] acute on chronic , []chronic  Heart Failure:  [] Diastolic (HFpEF), [] Systolic (HFrEF), []Combined (HFpEF and HFrEF), [] RHF, [] Pulm HTN, [] Other    [] LULU, [] ATN, [] AIN, [] other  [] CKD1, [] CKD2, [] CKD 3, [] CKD 4, [] CKD 5, []ESRD    Encephalopathy: [] Metabolic, [] Hepatic, [] toxic, [] Neurological, [] Other    Abnormal Nurtitional Status: [] malnurtition (see nutrition note), [ ]underweight: BMI < 19, [] morbid obesity: BMI >40, [] Cachexia    [] Sepsis  [] hypovolemic shock,[] cardiogenic shock, [] hemorrhagic shock, [] neuogenic shock  [] Acute Respiratory Failure  []Cerebral edema, [] Brain compression/ herniation,   [] Functional quadriplegia  [] Acute blood loss anemia

## 2019-08-28 NOTE — PHYSICAL THERAPY INITIAL EVALUATION ADULT - GENERAL OBSERVATIONS, REHAB EVAL
Patient received seated in chair, No apparent distress, +tele, ...., RN aware. Patient received seated in chair, No apparent distress, +tele, +heplock, RN aware.

## 2019-08-29 LAB
ANION GAP SERPL CALC-SCNC: 6 MMOL/L — SIGNIFICANT CHANGE UP (ref 5–17)
BASOPHILS # BLD AUTO: 0.03 K/UL — SIGNIFICANT CHANGE UP (ref 0–0.2)
BASOPHILS NFR BLD AUTO: 0.3 % — SIGNIFICANT CHANGE UP (ref 0–2)
BUN SERPL-MCNC: 19 MG/DL — SIGNIFICANT CHANGE UP (ref 7–23)
CALCIUM SERPL-MCNC: 8.8 MG/DL — SIGNIFICANT CHANGE UP (ref 8.4–10.5)
CHLORIDE SERPL-SCNC: 108 MMOL/L — SIGNIFICANT CHANGE UP (ref 96–108)
CO2 SERPL-SCNC: 29 MMOL/L — SIGNIFICANT CHANGE UP (ref 22–31)
CONFIRM APTT STACLOT: NEGATIVE — SIGNIFICANT CHANGE UP
CREAT SERPL-MCNC: 0.9 MG/DL — SIGNIFICANT CHANGE UP (ref 0.5–1.3)
DRVVT SCREEN TO CONFIRM RATIO: SIGNIFICANT CHANGE UP
EOSINOPHIL # BLD AUTO: 0.15 K/UL — SIGNIFICANT CHANGE UP (ref 0–0.5)
EOSINOPHIL NFR BLD AUTO: 1.7 % — SIGNIFICANT CHANGE UP (ref 0–6)
GLUCOSE BLDC GLUCOMTR-MCNC: 107 MG/DL — HIGH (ref 70–99)
GLUCOSE BLDC GLUCOMTR-MCNC: 109 MG/DL — HIGH (ref 70–99)
GLUCOSE BLDC GLUCOMTR-MCNC: 88 MG/DL — SIGNIFICANT CHANGE UP (ref 70–99)
GLUCOSE BLDC GLUCOMTR-MCNC: 99 MG/DL — SIGNIFICANT CHANGE UP (ref 70–99)
GLUCOSE SERPL-MCNC: 105 MG/DL — HIGH (ref 70–99)
HCT VFR BLD CALC: 33.1 % — LOW (ref 34.5–45)
HGB BLD-MCNC: 10 G/DL — LOW (ref 11.5–15.5)
IMM GRANULOCYTES NFR BLD AUTO: 0.3 % — SIGNIFICANT CHANGE UP (ref 0–1.5)
LA NT DPL PPP QL: 49.1 SEC — SIGNIFICANT CHANGE UP
LYMPHOCYTES # BLD AUTO: 3.21 K/UL — SIGNIFICANT CHANGE UP (ref 1–3.3)
LYMPHOCYTES # BLD AUTO: 35.7 % — SIGNIFICANT CHANGE UP (ref 13–44)
M PROTEIN 24H UR ELPH-MRATE: PRESENT
MCHC RBC-ENTMCNC: 28.5 PG — SIGNIFICANT CHANGE UP (ref 27–34)
MCHC RBC-ENTMCNC: 30.2 GM/DL — LOW (ref 32–36)
MCV RBC AUTO: 94.3 FL — SIGNIFICANT CHANGE UP (ref 80–100)
MONOCYTES # BLD AUTO: 0.68 K/UL — SIGNIFICANT CHANGE UP (ref 0–0.9)
MONOCYTES NFR BLD AUTO: 7.6 % — SIGNIFICANT CHANGE UP (ref 2–14)
NEUTROPHILS # BLD AUTO: 4.88 K/UL — SIGNIFICANT CHANGE UP (ref 1.8–7.4)
NEUTROPHILS NFR BLD AUTO: 54.4 % — SIGNIFICANT CHANGE UP (ref 43–77)
NRBC # BLD: 0 /100 WBCS — SIGNIFICANT CHANGE UP (ref 0–0)
PLATELET # BLD AUTO: 151 K/UL — SIGNIFICANT CHANGE UP (ref 150–400)
POTASSIUM SERPL-MCNC: 4.2 MMOL/L — SIGNIFICANT CHANGE UP (ref 3.5–5.3)
POTASSIUM SERPL-SCNC: 4.2 MMOL/L — SIGNIFICANT CHANGE UP (ref 3.5–5.3)
RBC # BLD: 3.51 M/UL — LOW (ref 3.8–5.2)
RBC # FLD: 15.9 % — HIGH (ref 10.3–14.5)
SODIUM SERPL-SCNC: 143 MMOL/L — SIGNIFICANT CHANGE UP (ref 135–145)
WBC # BLD: 8.98 K/UL — SIGNIFICANT CHANGE UP (ref 3.8–10.5)
WBC # FLD AUTO: 8.98 K/UL — SIGNIFICANT CHANGE UP (ref 3.8–10.5)

## 2019-08-29 PROCEDURE — 99222 1ST HOSP IP/OBS MODERATE 55: CPT

## 2019-08-29 PROCEDURE — 99232 SBSQ HOSP IP/OBS MODERATE 35: CPT

## 2019-08-29 RX ADMIN — CYCLOBENZAPRINE HYDROCHLORIDE 5 MILLIGRAM(S): 10 TABLET, FILM COATED ORAL at 13:02

## 2019-08-29 RX ADMIN — Medication 1 TABLET(S): at 13:03

## 2019-08-29 RX ADMIN — ENOXAPARIN SODIUM 40 MILLIGRAM(S): 100 INJECTION SUBCUTANEOUS at 21:22

## 2019-08-29 RX ADMIN — Medication 100 MILLIGRAM(S): at 21:22

## 2019-08-29 RX ADMIN — CYCLOBENZAPRINE HYDROCHLORIDE 5 MILLIGRAM(S): 10 TABLET, FILM COATED ORAL at 21:22

## 2019-08-29 RX ADMIN — AMLODIPINE BESYLATE 10 MILLIGRAM(S): 2.5 TABLET ORAL at 06:04

## 2019-08-29 RX ADMIN — HYDROMORPHONE HYDROCHLORIDE 2 MILLIGRAM(S): 2 INJECTION INTRAMUSCULAR; INTRAVENOUS; SUBCUTANEOUS at 09:51

## 2019-08-29 RX ADMIN — PANTOPRAZOLE SODIUM 40 MILLIGRAM(S): 20 TABLET, DELAYED RELEASE ORAL at 06:04

## 2019-08-29 RX ADMIN — Medication 100 MILLIGRAM(S): at 06:04

## 2019-08-29 RX ADMIN — CYCLOBENZAPRINE HYDROCHLORIDE 5 MILLIGRAM(S): 10 TABLET, FILM COATED ORAL at 06:04

## 2019-08-29 RX ADMIN — Medication 100 MILLIGRAM(S): at 13:06

## 2019-08-29 RX ADMIN — HYDROMORPHONE HYDROCHLORIDE 2 MILLIGRAM(S): 2 INJECTION INTRAMUSCULAR; INTRAVENOUS; SUBCUTANEOUS at 10:44

## 2019-08-29 NOTE — CONSULT NOTE ADULT - SUBJECTIVE AND OBJECTIVE BOX
Radiation Oncology Consult Note    HPI:  Ms. Yoan Holcomb is a 74 year-old female s/p T2-T4 laminectomy with resection of an epidural spinal tumor.  She began to experience mid-back pain "behind the breasts" in April of 2019.  She states she was evaluated by her PMD, cardiologist and pulmonologist and eventually referred to pain management, Dr. Kiran.  She was treated with meloxicam and baclofen without improvement in her symptoms, which prompted further workup.  She underwent an MRI thoracic spine on 7/11/19, which demonstrated an approximately 3cm intraspinal mass with features suggestive of extradural location from T3-T5 causing spinal cord compression.  She was referred to Dr. Bearden for surgical management and underwent T2-T4 laminectomy with resection of the tumor on 8/27/19.  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  Final pathology is pending.     Today, she reports feeling "crippled" since surgery, although she notes having just ambulated in the hallway with a walker.  She states her back pain has improved, now 3-4/10, exacerbated by activity.  She denies radiation of the pain, numbness and paresthesias.  She denies other complaints to include fevers, chills, headache, dizziness, lightheadedness, dyspnea, chest pain, palpitations, nausea, vomiting, constipation and diarrhea.  She is quite somnolent and requires frequent re-engaging throughout this interview.            EXAM:  MR SPINE THORACIC                          PROCEDURE DATE:  08/27/2019      INTERPRETATION:  PROCEDURE: MRI thoracic spine without contrast    INDICATION: Thoracic spine mass, cord compression. Preoperative   examination    TECHNIQUE: Limited MR imaging of the thoracic spine is obtained at the   discretion of the referring neurosurgeon, Dr. seo. Sagittal T1 and T2   and axial T2 images are obtained.     COMPARISON: Outside MRI thoracic spine from 07/11/2019 is imported for   review, as is outside chest CT from 05/09/2019    FINDINGS:    There is abnormal marrow replacement of the entire T4 posterior elements   extending into the left greater than right pedicles into the left   posterior T4 vertebral body. Volume has not significantly changed from   the recent prior MRI. There may be minimal extension into the left T4-5   neural foramen. Review of prior chest CT shows a heterogeneous appearance   of bone with expansion and somewhat prominent internal trabeculation that   may suggest the diagnosis of atypical hemangioma.    Extraosseous extension of this into the posterior and lateral epidural   spaces is approximately stable as well, spanning 3.4 cm craniocaudad and   1.5 cm transversely. There is anterior deviation and compression of the   thoracic spinal cord with only questionable/faint T2 hyperintense signal   without true cord edema signal or expansion. The conus medullaris ends at   T12-L1. There is no additional level of cord compression. The partially   imaged cervical spine shows multilevel disc herniations with spinal canal   stenosis at C5-6 and C6-7. Multilevel thoracic disc herniations are   present as well, the largest in the right foraminal zone at T11-12.    No compression deformity or gross malalignment. On T1 images, there is   pathologic marrow signal at the inferior aspect of the L1 vertebral body   that has progressed considerably from the prior MRI. Furthermore, a 7 mm   site of marrow replacement in the left posterior superior L1 vertebral   body previously measured 5 mm. A 10 mm focus of T1 hyperintense signal in   the T10 vertebral body previously measured 8 mm. A few additional smaller   foci of T1 hyperintense signal are more conspicuous on the current exam.   If pathology yields the diagnosis of malignancy, PET-CT is recommended or   contrast-enhanced MRI.    A single fiducial marker is placed on the skin at the level of T1.      IMPRESSION:    Limited MRI for surgical planning.    Posterior T4 bony and epidural mass with cord compression is not   significantly changed since 7/11/2019. Although its appearance on CT   suggests atypical/aggressive hemangioma, the presence of additional   T1-hypointense marrow lesions in T10 and L1 WHICH HAVE INCREASED is   worrisome for malignancy (metastasis, myeloma, lymphoma). Further imaging   is advised pending surgical pathology of T4 mass.      Allergies    oxycodone (Other)  Vicodin (Unknown)    Intolerances        MEDICATIONS  (STANDING):  amLODIPine   Tablet 10 milliGRAM(s) Oral daily  BUpivacaine liposome 1.3% Injectable (no eMAR) 20 milliLiter(s) Local Injection once  calcium carbonate 1250 mG  + Vitamin D (OsCal 500 + D) 1 Tablet(s) Oral daily  cyclobenzaprine 5 milliGRAM(s) Oral three times a day  dextrose 5%. 1000 milliLiter(s) (50 mL/Hr) IV Continuous <Continuous>  dextrose 50% Injectable 12.5 Gram(s) IV Push once  dextrose 50% Injectable 25 Gram(s) IV Push once  dextrose 50% Injectable 25 Gram(s) IV Push once  docusate sodium 100 milliGRAM(s) Oral three times a day  enoxaparin Injectable 40 milliGRAM(s) SubCutaneous every 24 hours  insulin lispro (HumaLOG) corrective regimen sliding scale   SubCutaneous Before meals and at bedtime  metoprolol succinate  milliGRAM(s) Oral daily  pantoprazole    Tablet 40 milliGRAM(s) Oral before breakfast    MEDICATIONS  (PRN):  dextrose 40% Gel 15 Gram(s) Oral once PRN Blood Glucose LESS THAN 70 milliGRAM(s)/deciliter  glucagon  Injectable 1 milliGRAM(s) IntraMuscular once PRN Glucose LESS THAN 70 milligrams/deciliter  HYDROmorphone   Tablet 2 milliGRAM(s) Oral every 6 hours PRN Moderate Pain (4 - 6)  magnesium hydroxide Suspension 30 milliLiter(s) Oral every 12 hours PRN Constipation  ondansetron Injectable 4 milliGRAM(s) IV Push every 6 hours PRN Nausea      PAST MEDICAL & SURGICAL HISTORY:  Urine frequency: Leakage  Gastritis, erosive  H. pylori  HLD (hyperlipidemia)  OA (osteoarthritis)  Diverticulitis  DM (diabetes mellitus): type 2/control with diet  Lumbar stenosis  HTN (hypertension)  History of total knee replacement, right: 2016  H/O laparoscopy: perforated pyloric ulcer  History of bunionectomy of both great toes  H/O total knee replacement, left: 2010      FAMILY HISTORY:  Family history of heart disease (Mother)      SOCIAL HISTORY:   Never smoker  Social alcohol use, 1-2 drinks/month  Denies drug use  Lives alone in [facility]     REVIEW OF SYSTEMS:    CONSTITUTIONAL: No weakness, fevers or chills  EYES/ENT: No visual changes;  No vertigo or throat pain   NECK: No pain or stiffness  RESPIRATORY: No cough, wheezing, hemoptysis; No shortness of breath  CARDIOVASCULAR: No chest pain or palpitations  GASTROINTESTINAL: No abdominal or epigastric pain. No nausea, vomiting, or hematemesis; No diarrhea or constipation. No melena or hematochezia.  GENITOURINARY: No dysuria, frequency or hematuria  NEUROLOGICAL: No numbness or weakness  MSK: Mid-back pain, 3-4/10  All other review of systems is negative unless indicated above.        T(F): 98.2 (08-29-19 @ 05:17), Max: 99.2 (08-28-19 @ 23:58)  HR: 66 (08-29-19 @ 10:45)  BP: 140/70 (08-29-19 @ 10:45)  RR: 18 (08-29-19 @ 05:42)  SpO2: 90% (08-29-19 @ 10:45)  Wt(kg): --    GENERAL: NAD, somnolent  HEAD:  Atraumatic, Normocephalic  EYES: EOMI, PERRLA, conjunctiva and sclera clear  NECK: Supple, No JVD  CHEST/LUNG: Clear to auscultation bilaterally; No wheeze  HEART: Regular rate and rhythm; No murmurs, rubs, or gallops  ABDOMEN: Soft, Nontender, Nondistended; Bowel sounds present  EXTREMITIES:  2+ Peripheral Pulses, No clubbing, cyanosis, or edema  NEUROLOGY: non-focal, CN II-XII grossly intact, strength 5/5 in B/L upper and lower extremities  SKIN: No rashes or lesions                          10.0   8.98  )-----------( 151      ( 29 Aug 2019 07:56 )             33.1       08-29    143  |  108  |  19  ----------------------------<  105<H>  4.2   |  29  |  0.90    Ca    8.8      29 Aug 2019 07:56  Phos  3.5     08-27  Mg     1.8     08-27

## 2019-08-29 NOTE — PROGRESS NOTE ADULT - SUBJECTIVE AND OBJECTIVE BOX
Neurology Follow up note    Name  ONUR ADAMS    HPI:  This patient is a 74 year old woman w/ HTN, ?COPD (non smoker), DM2 not on meds, hx erosive gastritis on PPI, s/p b/l knee sx and lumbar laminectomy, found to have a thoracic spine mass  after presenting with pain around her ribs/bra line since April.    The pain is getting worse and now she feels wobbly when she walks. She also has pain/point tenderness in the right SI joint.  She does have some urinary frequency.    Immediately preop she had localizing fidicuals placed in MRI. (27 Aug 2019 09:34)      Interval History - continues to have back pain - no new radicular symptoms in the LE - UE normal        REVIEW OF SYSTEMS    Vital Signs Last 24 Hrs  T(C): 36.8 (29 Aug 2019 05:17), Max: 37.3 (28 Aug 2019 23:58)  T(F): 98.2 (29 Aug 2019 05:17), Max: 99.2 (28 Aug 2019 23:58)  HR: 66 (29 Aug 2019 10:45) (50 - 66)  BP: 140/70 (29 Aug 2019 10:45) (110/57 - 140/70)  BP(mean): 78 (28 Aug 2019 14:59) (78 - 91)  RR: 18 (29 Aug 2019 05:42) (8 - 20)  SpO2: 90% (29 Aug 2019 10:45) (90% - 100%)    Physical Exam-     Mental Status- awake and alert     Cranial Nerves- full EOM    Gait and station- no foot drop     Motor- moves all 4 extremities    Reflexes- decreased    Sensation- no sensory level    Coordination- no tremors    Vascular - no bruits    Medications  amLODIPine   Tablet 10 milliGRAM(s) Oral daily  BUpivacaine liposome 1.3% Injectable (no eMAR) 20 milliLiter(s) Local Injection once  calcium carbonate 1250 mG  + Vitamin D (OsCal 500 + D) 1 Tablet(s) Oral daily  cyclobenzaprine 5 milliGRAM(s) Oral three times a day  dextrose 40% Gel 15 Gram(s) Oral once PRN  dextrose 5%. 1000 milliLiter(s) IV Continuous <Continuous>  dextrose 50% Injectable 12.5 Gram(s) IV Push once  dextrose 50% Injectable 25 Gram(s) IV Push once  dextrose 50% Injectable 25 Gram(s) IV Push once  docusate sodium 100 milliGRAM(s) Oral three times a day  enoxaparin Injectable 40 milliGRAM(s) SubCutaneous every 24 hours  glucagon  Injectable 1 milliGRAM(s) IntraMuscular once PRN  HYDROmorphone   Tablet 2 milliGRAM(s) Oral every 6 hours PRN  insulin lispro (HumaLOG) corrective regimen sliding scale   SubCutaneous Before meals and at bedtime  magnesium hydroxide Suspension 30 milliLiter(s) Oral every 12 hours PRN  metoprolol succinate  milliGRAM(s) Oral daily  ondansetron Injectable 4 milliGRAM(s) IV Push every 6 hours PRN  pantoprazole    Tablet 40 milliGRAM(s) Oral before breakfast      Lab      Radiology    Assessment- Thoracic mass     Plan- await pathology

## 2019-08-29 NOTE — PROGRESS NOTE ADULT - SUBJECTIVE AND OBJECTIVE BOX
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 1000 milliLiter(s) (50 mL/Hr) IV Continuous <Continuous>  dextrose 50% Injectable 12.5 Gram(s) IV Push once  dextrose 50% Injectable 25 Gram(s) IV Push once  dextrose 50% Injectable 25 Gram(s) IV Push once  docusate sodium 100 milliGRAM(s) Oral three times a day  enoxaparin Injectable 40 milliGRAM(s) SubCutaneous every 24 hours  insulin lispro (HumaLOG) corrective regimen sliding scale   SubCutaneous Before meals and at bedtime  metoprolol succinate  milliGRAM(s) Oral daily  pantoprazole    Tablet 40 milliGRAM(s) Oral before breakfast    MEDICATIONS  (PRN):  dextrose 40% Gel 15 Gram(s) Oral once PRN Blood Glucose LESS THAN 70 milliGRAM(s)/deciliter  glucagon  Injectable 1 milliGRAM(s) IntraMuscular once PRN Glucose LESS THAN 70 milligrams/deciliter  HYDROmorphone   Tablet 2 milliGRAM(s) Oral every 6 hours PRN Moderate Pain (4 - 6)  magnesium hydroxide Suspension 30 milliLiter(s) Oral every 12 hours PRN Constipation  ondansetron Injectable 4 milliGRAM(s) IV Push every 6 hours PRN Nausea      Allergies    oxycodone (Other)  Vicodin (Unknown)    Intolerances        DVT Prophylaxis: [ ] YES [ ] NO      Antibiotics: [ ] YES [ ] NO    Pain Scale (1-10):       Location:    Vital Signs Last 24 Hrs  T(C): 36.6 (29 Aug 2019 16:29), Max: 37.3 (28 Aug 2019 23:58)  T(F): 97.9 (29 Aug 2019 16:29), Max: 99.2 (28 Aug 2019 23:58)  HR: 59 (29 Aug 2019 16:29) (50 - 69)  BP: 132/68 (29 Aug 2019 16:29) (120/58 - 145/80)  BP(mean): --  RR: 18 (29 Aug 2019 16:29) (17 - 18)  SpO2: 97% (29 Aug 2019 16:29) (90% - 100%)    Drug Dosing Weight  Height (cm): 162.56 (27 Aug 2019 06:37)  Weight (kg): 84.7 (27 Aug 2019 06:37)  BMI (kg/m2): 32.1 (27 Aug 2019 06:37)  BSA (m2): 1.9 (27 Aug 2019 06:37)     Physical Exam:  · Constitutional	detailed exam  · Constitutional Details	well-developed; well-groomed  · Eyes	EOMI; PERRL; no drainage or redness  · ENMT Comments	dry mucous membranes  · Respiratory	detailed exam  · Respiratory Comments	normal breath sounds at the lung bases bilaterally  · Cardiovascular	Regular rate & rhythm, normal S1, S2; no murmurs, gallops or rubs; no S3, S4  · Abd-Soft non tender  ·Ext-no edema, clubbing or cyanosis    URINARY CATHETER: [ ] YES [ ] NO     LABS:  CBC Full  -  ( 29 Aug 2019 07:56 )  WBC Count : 8.98 K/uL  RBC Count : 3.51 M/uL  Hemoglobin : 10.0 g/dL  Hematocrit : 33.1 %  Platelet Count - Automated : 151 K/uL  Mean Cell Volume : 94.3 fl  Mean Cell Hemoglobin : 28.5 pg  Mean Cell Hemoglobin Concentration : 30.2 gm/dL  Auto Neutrophil # : 4.88 K/uL  Auto Lymphocyte # : 3.21 K/uL  Auto Monocyte # : 0.68 K/uL  Auto Eosinophil # : 0.15 K/uL  Auto Basophil # : 0.03 K/uL  Auto Neutrophil % : 54.4 %  Auto Lymphocyte % : 35.7 %  Auto Monocyte % : 7.6 %  Auto Eosinophil % : 1.7 %  Auto Basophil % : 0.3 %    08-29    143  |  108  |  19  ----------------------------<  105<H>  4.2   |  29  |  0.90    Ca    8.8      29 Aug 2019 07:56      PT/INR - ( 28 Aug 2019 09:40 )   PT: 14.6 sec;   INR: 1.28          PTT - ( 28 Aug 2019 09:40 )  PTT:39.2 sec      CULTURES:    RADIOLOGY & ADDITIONAL STUDIES:

## 2019-08-29 NOTE — PROGRESS NOTE ADULT - ASSESSMENT
74y/F with  1.  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 Bearden, Dr. D'Amico); lumbar stenosis  2.  Hypertension dyslipidemia Diabetes Mellitus  3.  erosive gastritis  4.  h/o OA, diverticulitis, urinary frequency    PLAN:   NEURO: neurochecks q1h, PRN pain meds with Tylenol, opiates, cyclobenzaprine  f/u final histopathology  REHAB:  physical therapy evaluation and management    EARLY MOB:      PULM:  PRN O2 support to keep sats >/=92%, incentive spirometry  CARDIO:  SBP goal 100-150mm Hg, cont amlodipine / metoprolol  ENDO:  Blood sugar goals 140-180 mg/dL, continue insulin sliding scale while on steroids  GI:  PPI for GI prophylaxis (home meds)  DIET: advance as tolerated  RENAL:  IVL once eating well  HEM/ONC: check post-op Hb  VTE Prophylaxis: SCDs, no DVT chemoprophylaxis for now as patient is high risk for bleed (fresh post-op): baseline LE Doppler for DVT suspected on admission (h/o spinal mass)  ID: afebrile, no leukocytosis, cefazolin perioperatively then d/c  Social: will update family    ATTENDING ATTESTATION:  I was physically present for the key portions of the evaluation and management (E/M) service provided.  I agree with the above history, physical and plan, which I have reviewed and edited where appropriate.    Patient at high risk for neurological deterioration or death due to:  ICU delirium, aspiration PNA, DVT / PE.  Critical care time:  I have personally provided 60 minutes of critical care time, excluding time spent on separate procedures.      Plan discussed with RN, house staff.
74y/F with  1.  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 Bearden, Dr. D'Amico); lumbar stenosis  2.  Hypertension dyslipidemia Diabetes Mellitus  3.  erosive gastritis  4.  h/o OA, diverticulitis, urinary frequency    PLAN:   NEURO: spine checks q4h, PRN pain meds with Tylenol, tramadol, no opiates  f/u final histopathology  REHAB:  physical therapy evaluation and management    EARLY MOB:  OOB to chair, ambulate    PULM:  Room air, incentive spirometry  CARDIO:  SBP goal 100-150mm Hg, cont amlodipine / metoprolol  ENDO:  Blood sugar goals 140-180 mg/dL, continue insulin sliding scale while on steroids  GI:  PPI for GI prophylaxis (home meds)  DIET: CCD  RENAL:  IVL  HEM/ONC: Hb stable  VTE Prophylaxis: SCDs, SQL, baseline LE Doppler for DVT suspected on admission (h/o spinal mass)  ID: afebrile, no leukocytosis, off ancef.  Social: will update family    ATTENDING ATTESTATION:  I was physically present for the key portions of the evaluation and management (E/M) service provided.  I agree with the above history, physical and plan which I have reviewed and edited where appropriate.     Patient not at high risk for neurologic deterioration / death.  Time spent on this noncritically ill patient: 45 minutes spent on total encounter, more than 50% of the visit was spent counseling and/or coordinating care by the attending physician.    Plan discussed with RN, house staff.    REVIEW OF SYSTEMS:  No headaches, no nausea or vomiting; 14 -point review of systems otherwise unremarkable.
POD 0 s/p lami and tumor resection neuro intact  q1h spinal checks  q1 VS  pain control  enc IS use  resume home meds  ADAT  FSG, ISS  LR hydration  SCDs  post op abx  d/w Dr. Bearden and Dr. Nieto
doing well post op, now has BJ protein in urine...final path not available yet...

## 2019-08-29 NOTE — CONSULT NOTE ADULT - ASSESSMENT
Ms. Yoan Holcomb is a 74 year-old female s/p T2-T4 laminectomy with resection of an epidural spinal tumor on 8/27/19.  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  Final pathology is pending.  Discussed the potential role of radiation as adjuvant therapy pending definitive diagnosis and staging as well as the logistics and side effects of RT to the thoracic spine with the patient.  She is quite somnolent today and was somewhat unengaged in this discussion.  She will follow-up in radiation medicine clinic after she is discharged to review final pathology, staging and further consideration of radiation therapy.

## 2019-08-29 NOTE — PROGRESS NOTE ADULT - SUBJECTIVE AND OBJECTIVE BOX
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    The pain is getting worse and now she feels wobbly when she walks. She also has pain/point tenderness in the right SI joint.  She does have some urinary frequency.    Immediately preop she had localizing fidicuals placed in MRI. (27 Aug 2019 09:34)    OVERNIGHT EVENTS:  Vital Signs Last 24 Hrs  T(C): 36.8 (29 Aug 2019 05:17), Max: 37.3 (28 Aug 2019 23:58)  T(F): 98.2 (29 Aug 2019 05:17), Max: 99.2 (28 Aug 2019 23:58)  HR: 66 (29 Aug 2019 10:45) (50 - 66)  BP: 140/70 (29 Aug 2019 10:45) (110/57 - 140/70)  BP(mean): 78 (28 Aug 2019 14:59) (78 - 91)  RR: 18 (29 Aug 2019 05:42) (8 - 20)  SpO2: 90% (29 Aug 2019 10:45) (90% - 100%)    I&O's Summary    28 Aug 2019 07:01  -  29 Aug 2019 07:00  --------------------------------------------------------  IN: 680 mL / OUT: 600 mL / NET: 80 mL    29 Aug 2019 07:01  -  29 Aug 2019 11:35  --------------------------------------------------------  IN: 120 mL / OUT: 350 mL / NET: -230 mL        8/27/19 OR thoracic lami/tumor resection, frozen: small blue cell tumor  8/28/19 POD#1 doing well, no overnight events.  Without complaints.  Eating, sitting up in chair.  Denies N/V/SOB/chest pain.  8/29/2019 uneventful night      PHYSICAL EXAM:  Neurological:      A&OX3 Cranial nerves intact  MELENDREZ  Thoracic incision intact        Cardiovascular: RRR  Respiratory: Lungs CTAB  Gastrointestinal: +bs  Genitourinary:  voiding without difficulty  Extremities: warm and dry  Incision/Wound: CDI        DIET: Regular      LABS:                        10.0   8.98  )-----------( 151      ( 29 Aug 2019 07:56 )             33.1     08-29    143  |  108  |  19  ----------------------------<  105<H>  4.2   |  29  |  0.90    Ca    8.8      29 Aug 2019 07:56  Phos  3.5     08-27  Mg     1.8     08-27      PT/INR - ( 28 Aug 2019 09:40 )   PT: 14.6 sec;   INR: 1.28          PTT - ( 28 Aug 2019 09:40 )  PTT:39.2 sec        CAPILLARY BLOOD GLUCOSE      POCT Blood Glucose.: 99 mg/dL (29 Aug 2019 06:44)  POCT Blood Glucose.: 136 mg/dL (28 Aug 2019 17:15)      Drug Levels: [] N/A    CSF Analysis: [] N/A      Allergies    oxycodone (Other)  Vicodin (Unknown)    Intolerances      MEDICATIONS:  Antibiotics:    Neuro:  cyclobenzaprine 5 milliGRAM(s) Oral three times a day  HYDROmorphone   Tablet 2 milliGRAM(s) Oral every 6 hours PRN  ondansetron Injectable 4 milliGRAM(s) IV Push every 6 hours PRN    Anticoagulation:  enoxaparin Injectable 40 milliGRAM(s) SubCutaneous every 24 hours    OTHER:  amLODIPine   Tablet 10 milliGRAM(s) Oral daily  BUpivacaine liposome 1.3% Injectable (no eMAR) 20 milliLiter(s) Local Injection once  dextrose 40% Gel 15 Gram(s) Oral once PRN  dextrose 50% Injectable 12.5 Gram(s) IV Push once  dextrose 50% Injectable 25 Gram(s) IV Push once  dextrose 50% Injectable 25 Gram(s) IV Push once  docusate sodium 100 milliGRAM(s) Oral three times a day  glucagon  Injectable 1 milliGRAM(s) IntraMuscular once PRN  insulin lispro (HumaLOG) corrective regimen sliding scale   SubCutaneous Before meals and at bedtime  magnesium hydroxide Suspension 30 milliLiter(s) Oral every 12 hours PRN  metoprolol succinate  milliGRAM(s) Oral daily  pantoprazole    Tablet 40 milliGRAM(s) Oral before breakfast    IVF:  calcium carbonate 1250 mG  + Vitamin D (OsCal 500 + D) 1 Tablet(s) Oral daily  dextrose 5%. 1000 milliLiter(s) IV Continuous <Continuous>    CULTURES:    RADIOLOGY & ADDITIONAL TESTS:      ASSESSMENT:    74y Female s/p thoracic laminectomy for tumor resection, frozen small blue cell tumor prelim.      PLAN:  NEURO:    Monitor neuro status  OT/PT  Pain Management   Bowel regime  Continue current medical regime    Dispo: Discussed with attending  Assessment:  Present when checked    []  GCS  E   V  M     Heart Failure: []Acute, [] acute on chronic , []chronic  Heart Failure:  [] Diastolic (HFpEF), [] Systolic (HFrEF), []Combined (HFpEF and HFrEF), [] RHF, [] Pulm HTN, [] Other    [] LULU, [] ATN, [] AIN, [] other  [] CKD1, [] CKD2, [] CKD 3, [] CKD 4, [] CKD 5, []ESRD    Encephalopathy: [] Metabolic, [] Hepatic, [] toxic, [] Neurological, [] Other    Abnormal Nurtitional Status: [] malnurtition (see nutrition note), [ ]underweight: BMI < 19, [] morbid obesity: BMI >40, [] Cachexia    [] Sepsis  [] hypovolemic shock,[] cardiogenic shock, [] hemorrhagic shock, [] neuogenic shock  [] Acute Respiratory Failure  []Cerebral edema, [] Brain compression/ herniation,   [] Functional quadriplegia  [] Acute blood loss anemia

## 2019-08-30 ENCOUNTER — TRANSCRIPTION ENCOUNTER (OUTPATIENT)
Age: 74
End: 2019-08-30

## 2019-08-30 ENCOUNTER — INBOUND DOCUMENT (OUTPATIENT)
Age: 74
End: 2019-08-30

## 2019-08-30 VITALS
RESPIRATION RATE: 17 BRPM | SYSTOLIC BLOOD PRESSURE: 141 MMHG | HEART RATE: 69 BPM | DIASTOLIC BLOOD PRESSURE: 83 MMHG | TEMPERATURE: 98 F | OXYGEN SATURATION: 94 %

## 2019-08-30 DIAGNOSIS — C90.30 SOLITARY PLASMACYTOMA NOT HAVING ACHIEVED REMISSION: ICD-10-CM

## 2019-08-30 LAB
GLUCOSE BLDC GLUCOMTR-MCNC: 108 MG/DL — HIGH (ref 70–99)
GLUCOSE BLDC GLUCOMTR-MCNC: 138 MG/DL — HIGH (ref 70–99)
IGA FLD-MCNC: 93 MG/DL — SIGNIFICANT CHANGE UP (ref 84–499)
IGG FLD-MCNC: 2958 MG/DL — HIGH (ref 610–1660)
IGM SERPL-MCNC: 27 MG/DL — LOW (ref 35–242)
KAPPA LC SER QL IFE: 1.03 MG/DL — SIGNIFICANT CHANGE UP (ref 0.33–1.94)
KAPPA/LAMBDA FREE LIGHT CHAIN RATIO, SERUM: 0.03 RATIO — LOW (ref 0.26–1.65)
LAMBDA LC SER QL IFE: 32.11 MG/DL — HIGH (ref 0.57–2.63)
LDH SERPL L TO P-CCNC: 187 U/L — SIGNIFICANT CHANGE UP (ref 50–242)
PROT SERPL-MCNC: 7.6 G/DL — SIGNIFICANT CHANGE UP (ref 6–8.3)
PROT SERPL-MCNC: 7.6 G/DL — SIGNIFICANT CHANGE UP (ref 6–8.3)
SURGICAL PATHOLOGY STUDY: SIGNIFICANT CHANGE UP

## 2019-08-30 PROCEDURE — 86850 RBC ANTIBODY SCREEN: CPT

## 2019-08-30 PROCEDURE — 83036 HEMOGLOBIN GLYCOSYLATED A1C: CPT

## 2019-08-30 PROCEDURE — 80048 BASIC METABOLIC PNL TOTAL CA: CPT

## 2019-08-30 PROCEDURE — 85598 HEXAGNAL PHOSPH PLTLT NEUTRL: CPT

## 2019-08-30 PROCEDURE — 82330 ASSAY OF CALCIUM: CPT

## 2019-08-30 PROCEDURE — 85730 THROMBOPLASTIN TIME PARTIAL: CPT

## 2019-08-30 PROCEDURE — 86901 BLOOD TYPING SEROLOGIC RH(D): CPT

## 2019-08-30 PROCEDURE — 85025 COMPLETE CBC W/AUTO DIFF WBC: CPT

## 2019-08-30 PROCEDURE — 88305 TISSUE EXAM BY PATHOLOGIST: CPT

## 2019-08-30 PROCEDURE — 88333 PATH CONSLTJ SURG CYTO XM 1: CPT

## 2019-08-30 PROCEDURE — 82962 GLUCOSE BLOOD TEST: CPT

## 2019-08-30 PROCEDURE — 85732 THROMBOPLASTIN TIME PARTIAL: CPT

## 2019-08-30 PROCEDURE — 97161 PT EVAL LOW COMPLEX 20 MIN: CPT

## 2019-08-30 PROCEDURE — 88364 INSITU HYBRIDIZATION (FISH): CPT

## 2019-08-30 PROCEDURE — 88341 IMHCHEM/IMCYTCHM EA ADD ANTB: CPT

## 2019-08-30 PROCEDURE — 85670 THROMBIN TIME PLASMA: CPT

## 2019-08-30 PROCEDURE — 85018 HEMOGLOBIN: CPT

## 2019-08-30 PROCEDURE — C1889: CPT

## 2019-08-30 PROCEDURE — 85210 CLOT FACTOR II PROTHROM SPEC: CPT

## 2019-08-30 PROCEDURE — 97116 GAIT TRAINING THERAPY: CPT

## 2019-08-30 PROCEDURE — 84156 ASSAY OF PROTEIN URINE: CPT

## 2019-08-30 PROCEDURE — 72146 MRI CHEST SPINE W/O DYE: CPT

## 2019-08-30 PROCEDURE — 84165 PROTEIN E-PHORESIS SERUM: CPT

## 2019-08-30 PROCEDURE — 85220 BLOOC CLOT FACTOR V TEST: CPT

## 2019-08-30 PROCEDURE — 88331 PATH CONSLTJ SURG 1 BLK 1SPC: CPT

## 2019-08-30 PROCEDURE — 86325 OTHER IMMUNOELECTROPHORESIS: CPT

## 2019-08-30 PROCEDURE — 83615 LACTATE (LD) (LDH) ENZYME: CPT

## 2019-08-30 PROCEDURE — 36415 COLL VENOUS BLD VENIPUNCTURE: CPT

## 2019-08-30 PROCEDURE — 88365 INSITU HYBRIDIZATION (FISH): CPT

## 2019-08-30 PROCEDURE — 97530 THERAPEUTIC ACTIVITIES: CPT

## 2019-08-30 PROCEDURE — 99232 SBSQ HOSP IP/OBS MODERATE 35: CPT

## 2019-08-30 PROCEDURE — 86900 BLOOD TYPING SEROLOGIC ABO: CPT

## 2019-08-30 PROCEDURE — 85230 CLOT FACTOR VII PROCONVERTIN: CPT

## 2019-08-30 PROCEDURE — 84295 ASSAY OF SERUM SODIUM: CPT

## 2019-08-30 PROCEDURE — 85610 PROTHROMBIN TIME: CPT

## 2019-08-30 PROCEDURE — 84132 ASSAY OF SERUM POTASSIUM: CPT

## 2019-08-30 PROCEDURE — 88360 TUMOR IMMUNOHISTOCHEM/MANUAL: CPT

## 2019-08-30 PROCEDURE — 97162 PT EVAL MOD COMPLEX 30 MIN: CPT

## 2019-08-30 PROCEDURE — 84100 ASSAY OF PHOSPHORUS: CPT

## 2019-08-30 PROCEDURE — 85027 COMPLETE CBC AUTOMATED: CPT

## 2019-08-30 PROCEDURE — 85260 CLOT FACTOR X STUART-POWER: CPT

## 2019-08-30 PROCEDURE — 85613 RUSSELL VIPER VENOM DILUTED: CPT

## 2019-08-30 PROCEDURE — 85611 PROTHROMBIN TEST: CPT

## 2019-08-30 PROCEDURE — 83735 ASSAY OF MAGNESIUM: CPT

## 2019-08-30 RX ORDER — HYDROMORPHONE HYDROCHLORIDE 2 MG/ML
1 INJECTION INTRAMUSCULAR; INTRAVENOUS; SUBCUTANEOUS
Qty: 28 | Refills: 0
Start: 2019-08-30 | End: 2019-09-05

## 2019-08-30 RX ORDER — CYCLOBENZAPRINE HYDROCHLORIDE 10 MG/1
1 TABLET, FILM COATED ORAL
Qty: 21 | Refills: 0
Start: 2019-08-30 | End: 2019-09-05

## 2019-08-30 RX ORDER — DOCUSATE SODIUM 100 MG
1 CAPSULE ORAL
Qty: 0 | Refills: 0 | DISCHARGE
Start: 2019-08-30

## 2019-08-30 RX ADMIN — CYCLOBENZAPRINE HYDROCHLORIDE 5 MILLIGRAM(S): 10 TABLET, FILM COATED ORAL at 12:47

## 2019-08-30 RX ADMIN — Medication 100 MILLIGRAM(S): at 05:03

## 2019-08-30 RX ADMIN — AMLODIPINE BESYLATE 10 MILLIGRAM(S): 2.5 TABLET ORAL at 05:02

## 2019-08-30 RX ADMIN — Medication 100 MILLIGRAM(S): at 12:47

## 2019-08-30 RX ADMIN — Medication 1 TABLET(S): at 12:40

## 2019-08-30 RX ADMIN — CYCLOBENZAPRINE HYDROCHLORIDE 5 MILLIGRAM(S): 10 TABLET, FILM COATED ORAL at 05:03

## 2019-08-30 RX ADMIN — PANTOPRAZOLE SODIUM 40 MILLIGRAM(S): 20 TABLET, DELAYED RELEASE ORAL at 05:02

## 2019-08-30 NOTE — DISCHARGE NOTE PROVIDER - NSDCACTIVITY_GEN_ALL_CORE
No heavy lifting/straining/Stairs allowed/Walking - Outdoors allowed/Return to Work/School allowed/Do not make important decisions/Walking - Indoors allowed/Do not drive or operate machinery

## 2019-08-30 NOTE — PROGRESS NOTE ADULT - PROBLEM SELECTOR PLAN 1
Final path Plasma cell tumor...    pt will hopefully f/u with me and receive RT and chemo .
awaiting final path...    Immunoelectrophoresis in am...

## 2019-08-30 NOTE — DISCHARGE NOTE NURSING/CASE MANAGEMENT/SOCIAL WORK - PATIENT PORTAL LINK FT
You can access the FollowMyHealth Patient Portal offered by [facility] by registering at the following website: [email or web address]. By joining [facility]’s FollowMyHealth portal, you will also be able to view your health information using other applications (apps) compatible with our system.

## 2019-08-30 NOTE — PROGRESS NOTE ADULT - SUBJECTIVE AND OBJECTIVE BOX
Neurology Follow up note    Name  ONUR ADAMS    HPI:  This patient is a 74 year old woman w/ HTN, ?COPD (non smoker), DM2 not on meds, hx erosive gastritis on PPI, s/p b/l knee sx and lumbar laminectomy, found to have a thoracic spine mass  after presenting with pain around her ribs/bra line since April.    The pain is getting worse and now she feels wobbly when she walks. She also has pain/point tenderness in the right SI joint.  She does have some urinary frequency.    Immediately preop she had localizing fidicuals placed in MRI. (27 Aug 2019 09:34)      Interval History - back pain and radicular symptoms in the LE - no new tremors - ambulating slowly         REVIEW OF SYSTEMS    Vital Signs Last 24 Hrs  T(C): 36.6 (30 Aug 2019 05:05), Max: 37.2 (29 Aug 2019 14:05)  T(F): 97.9 (30 Aug 2019 05:05), Max: 99 (29 Aug 2019 14:05)  HR: 75 (30 Aug 2019 05:05) (59 - 75)  BP: 146/90 (30 Aug 2019 05:05) (128/60 - 157/93)  BP(mean): --  RR: 17 (30 Aug 2019 05:05) (17 - 18)  SpO2: 93% (30 Aug 2019 05:05) (90% - 97%)    Physical Exam-     Mental Status- awake and alert     Cranial Nerves- full EOM    Gait and station- n/a    Motor- moves all 4 extremities     Reflexes- decreased    Sensation- no sensory level    Coordination- no tremors     Vascular -no tremors     Medications  amLODIPine   Tablet 10 milliGRAM(s) Oral daily  BUpivacaine liposome 1.3% Injectable (no eMAR) 20 milliLiter(s) Local Injection once  calcium carbonate 1250 mG  + Vitamin D (OsCal 500 + D) 1 Tablet(s) Oral daily  cyclobenzaprine 5 milliGRAM(s) Oral three times a day  dextrose 40% Gel 15 Gram(s) Oral once PRN  dextrose 5%. 1000 milliLiter(s) IV Continuous <Continuous>  dextrose 50% Injectable 12.5 Gram(s) IV Push once  dextrose 50% Injectable 25 Gram(s) IV Push once  dextrose 50% Injectable 25 Gram(s) IV Push once  docusate sodium 100 milliGRAM(s) Oral three times a day  enoxaparin Injectable 40 milliGRAM(s) SubCutaneous every 24 hours  glucagon  Injectable 1 milliGRAM(s) IntraMuscular once PRN  HYDROmorphone   Tablet 2 milliGRAM(s) Oral every 6 hours PRN  insulin lispro (HumaLOG) corrective regimen sliding scale   SubCutaneous Before meals and at bedtime  magnesium hydroxide Suspension 30 milliLiter(s) Oral every 12 hours PRN  metoprolol succinate  milliGRAM(s) Oral daily  ondansetron Injectable 4 milliGRAM(s) IV Push every 6 hours PRN  pantoprazole    Tablet 40 milliGRAM(s) Oral before breakfast      Lab      Radiology    Assessment- Thoracic mass     Plan await pathology

## 2019-08-30 NOTE — DISCHARGE NOTE PROVIDER - HOSPITAL COURSE
· Subjective and Objective:     HPI:    This patient is a 74 year old woman w/ HTN, ?COPD (non smoker), DM2 not on meds, hx erosive gastritis on PPI, s/p b/l knee sx and lumbar laminectomy, found to have a thoracic spine mass  after presenting with pain around her ribs/bra line since April.        The pain is getting worse and now she feels wobbly when she walks. She also has pain/point tenderness in the right SI joint.    She does have some urinary frequency.        Immediately preop she had localizing fidicuals placed in MRI. 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  Without complaints.  Eating, sitting up in chair.  Denies N/V/SOB/chest pain.    8/29/2019 uneventful nigh    8/30 uneventfu night  DC home today and follow up outpt        ASSESSMENT:    74y Female s/p  8/26/1019    · Operative Findings    thoracic laminectomy for resection of tumor    frozen - small blue cell tumor     preoperatively, fiducial markers placed on patient's spine and then patient went for MRI. these were used in the OR to localize the tumor.            Pt discharge to home

## 2019-08-30 NOTE — PROGRESS NOTE ADULT - SUBJECTIVE AND OBJECTIVE BOX
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    The pain is getting worse and now she feels wobbly when she walks. She also has pain/point tenderness in the right SI joint.  She does have some urinary frequency.    Immediately preop she had localizing fidicuals placed in MRI. 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  Without complaints.  Eating, sitting up in chair.  Denies N/V/SOB/chest pain.  8/29/2019 uneventful nigh  8/30 uneventfu night  DC home today and follow up outpt      PHYSICAL EXAM:  Neurological:      Motor exam:         [] Upper extremity              Bi(c5)  WE(c6)  EE(c7)   FF(c8)                                                R         5/5        5/5        5/5       5/5                                               L          5/5        5/5        5/5       5/5         [] Lower extremeity          HF(l2)   KE(l3)    TA(l4)   EHL(l5)  GS(s1)                                                 R        5/5        5/5        5/5       5/5         5/5                                               L         5/5        5/5       5/5       5/5          5/5                                                        [] warm well perfused; capillary refill <3 seconds     Sensation: [] intact to light touch  [] decreased:       Cardiovascular:  Respiratory:  Gastrointestinal:  Genitourinary:  Extremities:  Incision/Wound:    TUBES/LINES:  [] Gold  [] Lumbar Drain  [] Wound Drains  [] Others      DIET:  [] NPO  [] Mechanical  [] Tube feeds    LABS:                        10.0   8.98  )-----------( 151      ( 29 Aug 2019 07:56 )             33.1     08-29    143  |  108  |  19  ----------------------------<  105<H>  4.2   |  29  |  0.90    Ca    8.8      29 Aug 2019 07:56      PT/INR - ( 28 Aug 2019 09:40 )   PT: 14.6 sec;   INR: 1.28          PTT - ( 28 Aug 2019 09:40 )  PTT:39.2 sec        CAPILLARY BLOOD GLUCOSE      POCT Blood Glucose.: 108 mg/dL (30 Aug 2019 07:04)  POCT Blood Glucose.: 109 mg/dL (29 Aug 2019 21:48)  POCT Blood Glucose.: 107 mg/dL (29 Aug 2019 17:24)  POCT Blood Glucose.: 88 mg/dL (29 Aug 2019 11:56)      Drug Levels: [] N/A    CSF Analysis: [] N/A      Allergies    oxycodone (Other)  Vicodin (Unknown)    Intolerances      MEDICATIONS:  Antibiotics:    Neuro:  cyclobenzaprine 5 milliGRAM(s) Oral three times a day  HYDROmorphone   Tablet 2 milliGRAM(s) Oral every 6 hours PRN  ondansetron Injectable 4 milliGRAM(s) IV Push every 6 hours PRN    Anticoagulation:  enoxaparin Injectable 40 milliGRAM(s) SubCutaneous every 24 hours    OTHER:  amLODIPine   Tablet 10 milliGRAM(s) Oral daily  BUpivacaine liposome 1.3% Injectable (no eMAR) 20 milliLiter(s) Local Injection once  dextrose 40% Gel 15 Gram(s) Oral once PRN  dextrose 50% Injectable 12.5 Gram(s) IV Push once  dextrose 50% Injectable 25 Gram(s) IV Push once  dextrose 50% Injectable 25 Gram(s) IV Push once  docusate sodium 100 milliGRAM(s) Oral three times a day  glucagon  Injectable 1 milliGRAM(s) IntraMuscular once PRN  insulin lispro (HumaLOG) corrective regimen sliding scale   SubCutaneous Before meals and at bedtime  magnesium hydroxide Suspension 30 milliLiter(s) Oral every 12 hours PRN  metoprolol succinate  milliGRAM(s) Oral daily  pantoprazole    Tablet 40 milliGRAM(s) Oral before breakfast    IVF:  calcium carbonate 1250 mG  + Vitamin D (OsCal 500 + D) 1 Tablet(s) Oral daily  dextrose 5%. 1000 milliLiter(s) IV Continuous <Continuous>    CULTURES:    RADIOLOGY & ADDITIONAL TESTS:      ASSESSMENT:  74y Female s/p    PLAN:  NEURO:    CARDIOVASCULAR:    PULMONARY:    RENAL:    GI:    HEME:    ID:    ENDO:    DVT PROPHYLAXIS:  [] Venodynes                                [] Heparin/Lovenox    FALL RISK:  [] Low Risk                                    [] Impulsive  Assessment:  Present when checked    []  GCS  E   V  M     Heart Failure: []Acute, [] acute on chronic , []chronic  Heart Failure:  [] Diastolic (HFpEF), [] Systolic (HFrEF), []Combined (HFpEF and HFrEF), [] RHF, [] Pulm HTN, [] Other    [] LULU, [] ATN, [] AIN, [] other  [] CKD1, [] CKD2, [] CKD 3, [] CKD 4, [] CKD 5, []ESRD    Encephalopathy: [] Metabolic, [] Hepatic, [] toxic, [] Neurological, [] Other    Abnormal Nurtitional Status: [] malnurtition (see nutrition note), [ ]underweight: BMI < 19, [] morbid obesity: BMI >40, [] Cachexia    [] Sepsis  [] hypovolemic shock,[] cardiogenic shock, [] hemorrhagic shock, [] neuogenic shock  [] Acute Respiratory Failure  []Cerebral edema, [] Brain compression/ herniation,   [] Functional quadriplegia  [] Acute blood loss anemia HPI:  This patient is a 74 year old woman w/ HTN, ?COPD (non smoker), DM2 not on meds, hx erosive gastritis on PPI, s/p b/l knee sx and lumbar laminectomy, found to have a thoracic spine mass  after presenting with pain around her ribs/bra line since April.    The pain is getting worse and now she feels wobbly when she walks. She also has pain/point tenderness in the right SI joint.  She does have some urinary frequency.    Immediately preop she had localizing fidicuals placed in MRI. 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  Without complaints.  Eating, sitting up in chair.  Denies N/V/SOB/chest pain.  8/29/2019 uneventful nigh  8/30 uneventfu night  DC home today and follow up outpt      PHYSICAL EXAM:  Neurological:    A&OX3 Cranial nerves intact  MELENDREZ  Thoracic incision intact                                  Cardiovascular:RRR  Respiratory: Lungs CTAB  Gastrointestinal:  +BS  Genitourinary: voiding without difficulty  Extremities: warm and dry  Incision/Wound:  EUFEMIA  well healed no drainage noted  Sutures intact      DIET: Regular    LABS:                        10.0   8.98  )-----------( 151      ( 29 Aug 2019 07:56 )             33.1     08-29    143  |  108  |  19  ----------------------------<  105<H>  4.2   |  29  |  0.90    Ca    8.8      29 Aug 2019 07:56      PT/INR - ( 28 Aug 2019 09:40 )   PT: 14.6 sec;   INR: 1.28          PTT - ( 28 Aug 2019 09:40 )  PTT:39.2 sec        CAPILLARY BLOOD GLUCOSE      POCT Blood Glucose.: 108 mg/dL (30 Aug 2019 07:04)  POCT Blood Glucose.: 109 mg/dL (29 Aug 2019 21:48)  POCT Blood Glucose.: 107 mg/dL (29 Aug 2019 17:24)  POCT Blood Glucose.: 88 mg/dL (29 Aug 2019 11:56)      Drug Levels: [] N/A    CSF Analysis: [] N/A      Allergies    oxycodone (Other)  Vicodin (Unknown)    Intolerances      MEDICATIONS:  Antibiotics:    Neuro:  cyclobenzaprine 5 milliGRAM(s) Oral three times a day  HYDROmorphone   Tablet 2 milliGRAM(s) Oral every 6 hours PRN  ondansetron Injectable 4 milliGRAM(s) IV Push every 6 hours PRN    Anticoagulation:  enoxaparin Injectable 40 milliGRAM(s) SubCutaneous every 24 hours    OTHER:  amLODIPine   Tablet 10 milliGRAM(s) Oral daily  BUpivacaine liposome 1.3% Injectable (no eMAR) 20 milliLiter(s) Local Injection once  dextrose 40% Gel 15 Gram(s) Oral once PRN  dextrose 50% Injectable 12.5 Gram(s) IV Push once  dextrose 50% Injectable 25 Gram(s) IV Push once  dextrose 50% Injectable 25 Gram(s) IV Push once  docusate sodium 100 milliGRAM(s) Oral three times a day  glucagon  Injectable 1 milliGRAM(s) IntraMuscular once PRN  insulin lispro (HumaLOG) corrective regimen sliding scale   SubCutaneous Before meals and at bedtime  magnesium hydroxide Suspension 30 milliLiter(s) Oral every 12 hours PRN  metoprolol succinate  milliGRAM(s) Oral daily  pantoprazole    Tablet 40 milliGRAM(s) Oral before breakfast    IVF:  calcium carbonate 1250 mG  + Vitamin D (OsCal 500 + D) 1 Tablet(s) Oral daily  dextrose 5%. 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 these were used in the OR to localize the tumor.	    PLAN:  NEURO:    Monitor neuro status  OT/PT  Pain Managment  Bowel regime  Continue current medical regime  DC home and follow up ext Friday with Dr Bearden    Dispo: Discussed with attending        DVT PROPHYLAXIS:  [] Venodynes                                [] Heparin/Lovenox    FALL RISK:  [] Low Risk                                    [] Impulsive  Assessment:  Present when checked    []  GCS  E   V  M     Heart Failure: []Acute, [] acute on chronic , []chronic  Heart Failure:  [] Diastolic (HFpEF), [] Systolic (HFrEF), []Combined (HFpEF and HFrEF), [] RHF, [] Pulm HTN, [] Other    [] LULU, [] ATN, [] AIN, [] other  [] CKD1, [] CKD2, [] CKD 3, [] CKD 4, [] CKD 5, []ESRD    Encephalopathy: [] Metabolic, [] Hepatic, [] toxic, [] Neurological, [] Other    Abnormal Nurtitional Status: [] malnurtition (see nutrition note), [ ]underweight: BMI < 19, [] morbid obesity: BMI >40, [] Cachexia    [] Sepsis  [] hypovolemic shock,[] cardiogenic shock, [] hemorrhagic shock, [] neuogenic shock  [] Acute Respiratory Failure  []Cerebral edema, [] Brain compression/ herniation,   [] Functional quadriplegia  [] Acute blood loss anemia

## 2019-08-30 NOTE — PROGRESS NOTE ADULT - SUBJECTIVE AND OBJECTIVE BOX
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 1000 milliLiter(s) (50 mL/Hr) IV Continuous <Continuous>  dextrose 50% Injectable 12.5 Gram(s) IV Push once  dextrose 50% Injectable 25 Gram(s) IV Push once  dextrose 50% Injectable 25 Gram(s) IV Push once  docusate sodium 100 milliGRAM(s) Oral three times a day  enoxaparin Injectable 40 milliGRAM(s) SubCutaneous every 24 hours  insulin lispro (HumaLOG) corrective regimen sliding scale   SubCutaneous Before meals and at bedtime  metoprolol succinate  milliGRAM(s) Oral daily  pantoprazole    Tablet 40 milliGRAM(s) Oral before breakfast    MEDICATIONS  (PRN):  dextrose 40% Gel 15 Gram(s) Oral once PRN Blood Glucose LESS THAN 70 milliGRAM(s)/deciliter  glucagon  Injectable 1 milliGRAM(s) IntraMuscular once PRN Glucose LESS THAN 70 milligrams/deciliter  HYDROmorphone   Tablet 2 milliGRAM(s) Oral every 6 hours PRN Moderate Pain (4 - 6)  magnesium hydroxide Suspension 30 milliLiter(s) Oral every 12 hours PRN Constipation  ondansetron Injectable 4 milliGRAM(s) IV Push every 6 hours PRN Nausea      Allergies    oxycodone (Other)  Vicodin (Unknown)    Intolerances        DVT Prophylaxis: [ ] YES [ ] NO      Antibiotics: [ ] YES [ ] NO    Pain Scale (1-10):       Location:    Vital Signs Last 24 Hrs  T(C): 36.5 (30 Aug 2019 14:00), Max: 36.8 (30 Aug 2019 10:00)  T(F): 97.7 (30 Aug 2019 14:00), Max: 98.3 (30 Aug 2019 10:00)  HR: 69 (30 Aug 2019 14:00) (66 - 75)  BP: 141/83 (30 Aug 2019 14:00) (125/75 - 157/93)  BP(mean): --  RR: 17 (30 Aug 2019 14:00) (17 - 17)  SpO2: 94% (30 Aug 2019 14:00) (93% - 96%)    Drug Dosing Weight  Height (cm): 162.56 (27 Aug 2019 06:37)  Weight (kg): 84.7 (27 Aug 2019 06:37)  BMI (kg/m2): 32.1 (27 Aug 2019 06:37)  BSA (m2): 1.9 (27 Aug 2019 06:37)     Physical Exam:  · Constitutional	detailed exam  · Constitutional Details	well-developed; well-groomed  · Eyes	EOMI; PERRL; no drainage or redness  · ENMT Comments	dry mucous membranes  · Respiratory	detailed exam  · Respiratory Comments	normal breath sounds at the lung bases bilaterally  · Cardiovascular	Regular rate & rhythm, normal S1, S2; no murmurs, gallops or rubs; no S3, S4  · Abd-Soft non tender  ·Ext-no edema, clubbing or cyanosis    URINARY CATHETER: [ ] YES [ ] NO     LABS:  CBC Full  -  ( 29 Aug 2019 07:56 )  WBC Count : 8.98 K/uL  RBC Count : 3.51 M/uL  Hemoglobin : 10.0 g/dL  Hematocrit : 33.1 %  Platelet Count - Automated : 151 K/uL  Mean Cell Volume : 94.3 fl  Mean Cell Hemoglobin : 28.5 pg  Mean Cell Hemoglobin Concentration : 30.2 gm/dL  Auto Neutrophil # : 4.88 K/uL  Auto Lymphocyte # : 3.21 K/uL  Auto Monocyte # : 0.68 K/uL  Auto Eosinophil # : 0.15 K/uL  Auto Basophil # : 0.03 K/uL  Auto Neutrophil % : 54.4 %  Auto Lymphocyte % : 35.7 %  Auto Monocyte % : 7.6 %  Auto Eosinophil % : 1.7 %  Auto Basophil % : 0.3 %    08-29    143  |  108  |  19  ----------------------------<  105<H>  4.2   |  29  |  0.90    Ca    8.8      29 Aug 2019 07:56    TPro  7.6  /  Alb  x   /  TBili  x   /  DBili  x   /  AST  x   /  ALT  x   /  AlkPhos  x   08-30          CULTURES:    RADIOLOGY & ADDITIONAL STUDIES:

## 2019-08-30 NOTE — PROGRESS NOTE ADULT - PROVIDER SPECIALTY LIST ADULT
Heme/Onc
NSICU
Neurology
Neurology
Neurosurgery
NSICU
Heme/Onc

## 2019-09-03 LAB
% ALBUMIN: 38.4 % — SIGNIFICANT CHANGE UP
% ALPHA 1: 4.8 % — SIGNIFICANT CHANGE UP
% ALPHA 2: 12.2 % — SIGNIFICANT CHANGE UP
% BETA: 7.7 % — SIGNIFICANT CHANGE UP
% GAMMA: 36.9 % — SIGNIFICANT CHANGE UP
% M SPIKE: 31.1 % — SIGNIFICANT CHANGE UP
ALBUMIN SERPL ELPH-MCNC: 2.9 G/DL — LOW (ref 3.6–5.5)
ALBUMIN/GLOB SERPL ELPH: 0.6 RATIO — SIGNIFICANT CHANGE UP
ALPHA1 GLOB SERPL ELPH-MCNC: 0.4 G/DL — SIGNIFICANT CHANGE UP (ref 0.1–0.4)
ALPHA2 GLOB SERPL ELPH-MCNC: 0.9 G/DL — SIGNIFICANT CHANGE UP (ref 0.5–1)
B-GLOBULIN SERPL ELPH-MCNC: 0.6 G/DL — SIGNIFICANT CHANGE UP (ref 0.5–1)
GAMMA GLOBULIN: 2.8 G/DL — HIGH (ref 0.6–1.6)
INTERPRETATION SERPL IFE-IMP: SIGNIFICANT CHANGE UP
M-SPIKE: 2.4 G/DL — HIGH (ref 0–0)
PROT PATTERN SERPL ELPH-IMP: SIGNIFICANT CHANGE UP

## 2019-09-06 PROBLEM — R35.0 FREQUENCY OF MICTURITION: Chronic | Status: ACTIVE | Noted: 2019-08-27

## 2019-09-06 PROBLEM — K29.60 OTHER GASTRITIS WITHOUT BLEEDING: Chronic | Status: ACTIVE | Noted: 2019-08-26

## 2019-09-10 ENCOUNTER — APPOINTMENT (OUTPATIENT)
Dept: NEUROSURGERY | Facility: CLINIC | Age: 74
End: 2019-09-10
Payer: MEDICARE

## 2019-09-10 VITALS
RESPIRATION RATE: 18 BRPM | SYSTOLIC BLOOD PRESSURE: 112 MMHG | HEIGHT: 64 IN | TEMPERATURE: 98 F | BODY MASS INDEX: 32.44 KG/M2 | OXYGEN SATURATION: 97 % | DIASTOLIC BLOOD PRESSURE: 71 MMHG | HEART RATE: 64 BPM | WEIGHT: 190 LBS

## 2019-09-10 DIAGNOSIS — R29.898 OTHER SYMPTOMS AND SIGNS INVOLVING THE MUSCULOSKELETAL SYSTEM: ICD-10-CM

## 2019-09-10 DIAGNOSIS — G89.18 OTHER ACUTE POSTPROCEDURAL PAIN: ICD-10-CM

## 2019-09-10 DIAGNOSIS — Z09 ENCOUNTER FOR FOLLOW-UP EXAMINATION AFTER COMPLETED TREATMENT FOR CONDITIONS OTHER THAN MALIGNANT NEOPLASM: ICD-10-CM

## 2019-09-10 PROCEDURE — 99024 POSTOP FOLLOW-UP VISIT: CPT

## 2019-09-10 NOTE — REVIEW OF SYSTEMS
[Leg Weakness] : leg weakness [Poor Coordination] : poor coordination [Difficulty Walking] : difficulty walking [Negative] : Heme/Lymph

## 2019-09-12 ENCOUNTER — APPOINTMENT (OUTPATIENT)
Dept: HEMATOLOGY ONCOLOGY | Facility: CLINIC | Age: 74
End: 2019-09-12

## 2019-09-18 NOTE — ASSESSMENT
[FreeTextEntry1] : Doing well\par No Infection at incision site\par Begin Physical Therapy for BLE strengthening\par Follow up with Dr. Bearden.\par Follow up with Radiation Oncology\par Education provided regarding plan of care.\par

## 2019-09-18 NOTE — REASON FOR VISIT
[de-identified] : 8/27/19 [de-identified] : S/P THORACIC LAMINECTOMY FOR TUMOR [de-identified] : Doing well\par Bilat LE weakness\par Ambulates with Cane\par Thoracic incision without signs of infection.\par Incision healing well; No Signs of ingection

## 2019-09-18 NOTE — PHYSICAL EXAM
[General Appearance - Alert] : alert [General Appearance - In No Acute Distress] : in no acute distress [Longitudinal] : longitudinal [Healing Well] : healing well [No Drainage] : without drainage [Sutures Intact] : closed with intact sutures [Normal Skin] : normal [Oriented To Time, Place, And Person] : oriented to person, place, and time [Impaired Insight] : insight and judgment were intact [I: Normal Smell] : smell intact bilaterally [Cranial Nerves Optic (II)] : visual acuity intact bilaterally,  pupils equal round and reactive to light [Cranial Nerves Oculomotor (III)] : extraocular motion intact [Cranial Nerves Trigeminal (V)] : facial sensation intact symmetrically [Cranial Nerves Vestibulocochlear (VIII)] : hearing was intact bilaterally [Cranial Nerves Facial (VII)] : face symmetrical [Cranial Nerves Accessory (XI - Cranial And Spinal)] : head turning and shoulder shrug symmetric [Cranial Nerves Glossopharyngeal (IX)] : tongue and palate midline [Cranial Nerves Hypoglossal (XII)] : there was no tongue deviation with protrusion [Neck Appearance] : the appearance of the neck was normal [Sclera] : the sclera and conjunctiva were normal [Outer Ear] : the ears and nose were normal in appearance [] : no respiratory distress [Bowel Sounds] : normal bowel sounds [FreeTextEntry1] : GAit Instability [Skin Color & Pigmentation] : normal skin color and pigmentation

## 2019-09-19 ENCOUNTER — APPOINTMENT (OUTPATIENT)
Dept: NEUROSURGERY | Facility: CLINIC | Age: 74
End: 2019-09-19
Payer: MEDICARE

## 2019-09-19 VITALS
SYSTOLIC BLOOD PRESSURE: 147 MMHG | HEIGHT: 64 IN | DIASTOLIC BLOOD PRESSURE: 81 MMHG | RESPIRATION RATE: 18 BRPM | WEIGHT: 190 LBS | OXYGEN SATURATION: 95 % | HEART RATE: 66 BPM | TEMPERATURE: 98.4 F | BODY MASS INDEX: 32.44 KG/M2

## 2019-09-19 DIAGNOSIS — M54.6 PAIN IN THORACIC SPINE: ICD-10-CM

## 2019-09-19 DIAGNOSIS — R26.9 UNSPECIFIED ABNORMALITIES OF GAIT AND MOBILITY: ICD-10-CM

## 2019-09-19 DIAGNOSIS — I71.4 ABDOMINAL AORTIC ANEURYSM, W/OUT RUPTURE: ICD-10-CM

## 2019-09-19 PROCEDURE — 99024 POSTOP FOLLOW-UP VISIT: CPT

## 2019-09-20 ENCOUNTER — APPOINTMENT (OUTPATIENT)
Dept: RADIATION ONCOLOGY | Facility: CLINIC | Age: 74
End: 2019-09-20
Payer: MEDICARE

## 2019-09-20 VITALS
BODY MASS INDEX: 31.17 KG/M2 | HEART RATE: 55 BPM | OXYGEN SATURATION: 96 % | SYSTOLIC BLOOD PRESSURE: 118 MMHG | WEIGHT: 181.6 LBS | DIASTOLIC BLOOD PRESSURE: 78 MMHG | RESPIRATION RATE: 16 BRPM

## 2019-09-20 DIAGNOSIS — Z87.09 PERSONAL HISTORY OF OTHER DISEASES OF THE RESPIRATORY SYSTEM: ICD-10-CM

## 2019-09-20 DIAGNOSIS — Z86.39 PERSONAL HISTORY OF OTHER ENDOCRINE, NUTRITIONAL AND METABOLIC DISEASE: ICD-10-CM

## 2019-09-20 PROCEDURE — 99215 OFFICE O/P EST HI 40 MIN: CPT | Mod: 25

## 2019-09-20 RX ORDER — MELOXICAM 15 MG/1
TABLET ORAL
Refills: 0 | Status: DISCONTINUED | COMMUNITY
End: 2019-09-20

## 2019-09-20 RX ORDER — CALCIUM CARBONATE/VITAMIN D3 600 MG-10
TABLET ORAL
Refills: 0 | Status: ACTIVE | COMMUNITY

## 2019-09-20 RX ORDER — BACLOFEN 15 MG/1
TABLET ORAL
Refills: 0 | Status: DISCONTINUED | COMMUNITY
End: 2019-09-20

## 2019-09-20 RX ORDER — FLUOROMETHOLONE 1 MG/ML
0.1 SOLUTION/ DROPS OPHTHALMIC
Qty: 10 | Refills: 0 | Status: DISCONTINUED | COMMUNITY
Start: 2018-03-16 | End: 2019-09-20

## 2019-09-20 RX ORDER — PROMETHAZINE HYDROCHLORIDE AND DEXTROMETHORPHAN HYDROBROMIDE ORAL SOLUTION 15; 6.25 MG/5ML; MG/5ML
6.25-15 SOLUTION ORAL
Qty: 240 | Refills: 0 | Status: DISCONTINUED | COMMUNITY
Start: 2018-03-17 | End: 2019-09-20

## 2019-09-20 RX ORDER — CLOTRIMAZOLE AND BETAMETHASONE DIPROPIONATE 10; .5 MG/G; MG/G
1-0.05 CREAM TOPICAL
Qty: 45 | Refills: 0 | Status: DISCONTINUED | COMMUNITY
Start: 2017-10-31 | End: 2019-09-20

## 2019-09-20 RX ORDER — FLUTICASONE PROPIONATE 50 UG/1
50 SPRAY, METERED NASAL
Qty: 16 | Refills: 0 | Status: DISCONTINUED | COMMUNITY
Start: 2018-01-29 | End: 2019-09-20

## 2019-09-20 RX ORDER — OMEPRAZOLE 40 MG/1
40 CAPSULE, DELAYED RELEASE ORAL
Qty: 30 | Refills: 3 | Status: DISCONTINUED | COMMUNITY
Start: 2018-03-26 | End: 2019-09-20

## 2019-09-20 RX ORDER — AZELASTINE HYDROCHLORIDE 0.5 MG/ML
0.05 SOLUTION/ DROPS OPHTHALMIC
Qty: 6 | Refills: 0 | Status: DISCONTINUED | COMMUNITY
Start: 2017-08-07 | End: 2019-09-20

## 2019-09-20 NOTE — VITALS
[Maximal Pain Intensity: 0/10] : 0/10 [Least Pain Intensity: 0/10] : 0/10 [ECOG Performance Status: 2 - Ambulatory and capable of all self care but unable to carry out any work activities] : Performance Status: 2 - Ambulatory and capable of all self care but unable to carry out any work activities. Up and about more than 50% of waking hours [80: Normal activity with effort; some signs or symptoms of disease.] : 80: Normal activity with effort; some signs or symptoms of disease.

## 2019-09-24 NOTE — HISTORY OF PRESENT ILLNESS
[FreeTextEntry1] : 9/20/19- [facility]\par Ms. 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  She was seen as an inpatient on 8/29/19, and at the time final pathology was pending.  \par \par On 8/27/19, she underwent T2- T4 laminectomy with resection of epidural spinal tumor. Final pathology revealed the following: \par -Spinal mass, biopsy: Plasma cell neoplasm.  \par -Spinal mass, excision: Plasma cell neoplasm.  \par Note: Immunostains show tumor cells are positive for  and are negative for CD20, CAM5.2, S100 and synaptophysin.  Ki-67\par shows a proliferation index of approximately 40%.  Clinical and regular correlation are needed to determine whether this represents multiple myeloma or plasmacytoma.  \par \par FISH for kappa and lambda in-situ hybridization showed lambda light chain restriction.\par \par FLUORESCENCE IN-SITU HYBRIDIZATION (FISH)\par Myeloma prognosis\par \par INTERPRETATION\par 1. Negative for 1q21/CKS1B gain RB1 (13q14) deletion is present.\par Comment: Deletion of RB1 gene (retinoblastoma1) on chromosome 13q14 can be seen in 15-40% of newly diagnosed\par cases of plasma cell myeloma and is an independent adverse prognostic variable, despite its relative frequency. (Blood\par 95:[id number]). Loss of chromosome 13 is also considered a poor prognostic factor if it occurs in conjunction with deletion\par 17p (TP53) or FGFR3/IGH [t(4;14)], and not as a sole abnormality.\par 2. No evidence of CCND1-IGH [translocation t(11;14)] gene rearrangement, and no evidence for trisomy 11 or gain of 11q.\par Comment: 50% of Interphase Nuclei contain 3-4 (orange) and 2-3 (green) signals.\par 3.\par 4. No evidence of p53 (17p13) deletion or amplification.\par 5. No evidence of FGFR3-IGH [translocation t(4;14)] gene rearrangement.\par 6. No evidence of IGH-MAF [translocation t(14;16)] gene rearrangement\par \par She was discharged home from [facility] on 8/30/19.  Her case was reviewed at Neuro tumor board on 9/10/19; consensus as follows: \par Rad- Multiple lesions, Path- Plasma cell neoplasm, likely myeloma, Plan- Referral to Dr. Hackett for RVD regimen.  She followed up with NP Rolly Rodriguez on 9/10/19; plan was to start Physical Therapy and follow up with Dr. Bearden. She followed up with Dr. Bearden on 9/19/19; plan for f/u with radiation, hematology, PCP and tumor board discussion on 9/23. She had been scheduled to see Dr. Hackett on 9/12/19, but patient cancelled her appointment.  She has a long standing relationship with Dr. Renee Hernandez for a "clotting problem" so she has elected to follow-up with him instead.  She states she saw him last week and was recommended chemotherapy and radiation.  He also sent her for a PET/CT, which was done on 9/17/19 at [facility].  \par \par PET/CT 9/17/19 - IMPRESSION: \par 1.  Status post spinal surgery in the thoracic spine with laminectomies at T3-T5.  \par 2.  Hypermetabolic activity in the remaining posterior elements of the T4 and the adjacent paraspinal soft tissues.  Thse findings are nonspecific, and may be post surgical in etiology.  \par 3.  Multiple additional hypermetabolic lytic lesions in the skeleton.  These findings are consistent with multiple myeloma.\par 4.  Otherwise, no abnormal hypermetabolic activity to suggest malignancy at this time.\par 5.  Diverticular disease. \par 6.  Small umbilical hernia containing fat but not bowel.  \par 7.  Low-attenuation/cystic lesions in the right retrocrural space, which are not significantly hypermetabolic on the corresponding PET images, and may represent mildly enlarged lymph nodes. \par \par Today, she reports feeling well since surgery.  She states her pain has resolved.  She no longer feels pain with deep inspiration and endorses only stable dyspnea secondary to her COPD.  She endorses chronic left lower extremity tingling unchanged since back surgery in 2016 and bilateral lower extremity weakness for which she has been referred for PT.  \par \par Of note, she lives with a roommate in [place].  She is a retired mental health worker.  She admits to social smoking over 30 years ago and rare alcohol use.  She denies drug use.  She is post menopausal.  \par \par _________________________________________________\par INPATIENT CONSULT (8/29/19)\par Ms. Yoan Holcomb is a 74 year-old female s/p T2-T4 laminectomy with resection of an epidural spinal tumor.  She began to experience mid-back pain "behind the breasts" in April of 2019.  She states she was evaluated by her PMD, cardiologist and pulmonologist and eventually referred to pain management, Dr. Kiran.  She was treated with meloxicam and baclofen without improvement in her symptoms, which prompted further workup.  She underwent an MRI thoracic spine on 7/11/19, which demonstrated an approximately 3cm intraspinal mass with features suggestive of extradural location from T3-T5 causing spinal cord compression.  She was referred to Dr. Bearden for surgical management and underwent T2-T4 laminectomy with resection of the tumor on 8/27/19.  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  Final pathology is pending. \par \par Today, she reports feeling "crippled" since surgery, although she notes having just ambulated in the hallway with a walker.  She states her back pain has improved, now 3-4/10, exacerbated by activity.  She denies radiation of the pain, numbness and paresthesias.  She denies other complaints to include fevers, chills, headache, dizziness, lightheadedness, dyspnea, chest pain, palpitations, nausea, vomiting, constipation and diarrhea.  She is quite somnolent and requires frequent re-engaging throughout this interview.  \par \par \par MRI Thoracic Spine w/o juice, done 8/27/19 showed the following:                       \par \par COMPARISON: Outside MRI thoracic spine from 07/11/2019 is imported for review, as is outside chest CT from 05/09/2019\par \par FINDINGS:\par \par There is abnormal marrow replacement of the entire T4 posterior elements extending into the left greater than right pedicles into the left posterior T4 vertebral body. Volume has not significantly changed from the recent prior MRI. There may be minimal extension into the left T4-5 neural foramen. Review of prior chest CT shows a heterogeneous appearance of bone with expansion and somewhat prominent internal trabeculation that may suggest the diagnosis of atypical hemangioma.\par \par Extraosseous extension of this into the posterior and lateral epidural spaces is approximately stable as well, spanning 3.4 cm craniocaudad and 1.5 cm transversely. There is anterior deviation and compression of the thoracic spinal cord with only questionable/ faint T2 hyperintense signal without true cord edema signal or expansion. The conus medullaris ends at T12-L1. There is no additional level of cord compression. The partially imaged cervical spine shows multilevel disc herniations with spinal canal \par stenosis at C5-6 and C6-7. Multilevel thoracic disc herniations are present as well, the largest in the right foraminal zone at T11-12.\par \par No compression deformity or gross malalignment. On T1 images, there is pathologic marrow signal at the inferior aspect of the L1 vertebral body that has progressed considerably from the prior MRI. Furthermore, a 7 mm site of marrow replacement in the left posterior superior L1 vertebral body previously measured 5 mm. A 10 mm focus of T1 hyperintense signal in the T10 vertebral body previously measured 8 mm. A few additional smaller foci of T1 hyperintense signal are more conspicuous on the current exam. \par If pathology yields the diagnosis of malignancy, PET-CT is recommended or contrast-enhanced MRI.\par \par A single fiducial marker is placed on the skin at the level of T1.\par \par IMPRESSION:\par -Limited MRI for surgical planning.\par -Posterior T4 bony and epidural mass with cord compression is not significantly changed since 7/11/2019. Although its appearance on CT suggests atypical/ aggressive hemangioma, the presence of additional T1-hypointense marrow lesions in T10 and L1 WHICH HAVE INCREASED is worrisome for malignancy (metastasis, myeloma, lymphoma). Further imaging is advised pending surgical pathology of T4 mass.

## 2019-09-24 NOTE — PHYSICAL EXAM
[Heart Rate And Rhythm] : heart rate and rhythm were normal [Heart Sounds] : normal S1 and S2 [Normal] : no focal deficits [de-identified] : healed t-spine incision  [de-identified] : CN II-XII grossly intact, strength 5/5 B/L upper extremities, 4/5 B/L lower extremities.  Ambulates with cane.  sensation intact to pin prick and light touch.

## 2019-09-24 NOTE — DISEASE MANAGEMENT
[Clinical] : TNM Stage: c [N/A] : Currently not applicable [FreeTextEntry4] : Multiple Myeloma [TTNM] : . [NTNM] : . [MTNM] : .

## 2019-09-24 NOTE — REVIEW OF SYSTEMS
[Patient Intake Form Reviewed] : Patient intake form was reviewed [Negative] : Heme/Lymph [FreeTextEntry6] : unchanged dyspnea, COPD [FreeTextEntry7] : c [FreeTextEntry8] : chronic urge incontinence, unchanged  [FreeTextEntry9] : B/L LE weakness since surgery [de-identified] : healed thoracic spine incision

## 2019-09-25 PROBLEM — I71.4 ABDOMINAL AORTIC ANEURYSM (AAA) WITHOUT RUPTURE: Status: ACTIVE | Noted: 2019-09-25

## 2019-09-25 NOTE — REASON FOR VISIT
[de-identified] : S/P THORACIC LAMINECTOMY FOR TUMOR [de-identified] : 8/27/19 [de-identified] : Doing well\par Bilat LE weakness\par Ambulates with Cane\par Thoracic incision without signs of infection.\par Incision healing well; No Signs of ingection

## 2019-09-25 NOTE — ASSESSMENT
[FreeTextEntry1] : Doing well post Thoracic resection of tumor\par Follow up Radiation medicine\par Follow up Hematology\par Follow up with PCP\par F/U MRI BRAIN WITH AND WITHOUT 3 months\par TUMOR BOARD DISCUSSION for 9/23/19\par Education provided regarding plan of care.\par \par

## 2019-09-25 NOTE — ADDENDUM
[FreeTextEntry1] : September 19, 2019  Dionisio Sharma  [address] [place] [address]  Re:	Yoan Holcomb   Dear Dionisio:  I saw Yoan in followup today.  She underwent an uncomplicated resection of a dorsal epidural mass lesion that was found to be consistent with lymphoma.  This lymphoproliferative tumor has a relatively good prognosis given the pathology.  We are anticipating postoperative radiation with Dr. Mcmillan here at [facility] and will certainly keep you abreast of her progress.  Overall, she has done beautifully and will continue following up as necessary.   Thanks so much for your kind referral.   Sincerely,    MD JONATHAN Pryor/[facility] #[id number]  CC:	Kalin Kiran M.D. 	[address] [place] [id number] 	 	Albino Mcmillan MD 	[facility] 	Department of Radiation Medicine 	[address] [address]

## 2019-09-25 NOTE — PHYSICAL EXAM
[General Appearance - Alert] : alert [General Appearance - In No Acute Distress] : in no acute distress [Longitudinal] : longitudinal [Healing Well] : healing well [Sutures Intact] : closed with intact sutures [No Drainage] : without drainage [Normal Skin] : normal [Oriented To Time, Place, And Person] : oriented to person, place, and time [Impaired Insight] : insight and judgment were intact [I: Normal Smell] : smell intact bilaterally [Cranial Nerves Optic (II)] : visual acuity intact bilaterally,  pupils equal round and reactive to light [Cranial Nerves Oculomotor (III)] : extraocular motion intact [Cranial Nerves Trigeminal (V)] : facial sensation intact symmetrically [Cranial Nerves Facial (VII)] : face symmetrical [Cranial Nerves Vestibulocochlear (VIII)] : hearing was intact bilaterally [Cranial Nerves Glossopharyngeal (IX)] : tongue and palate midline [Cranial Nerves Accessory (XI - Cranial And Spinal)] : head turning and shoulder shrug symmetric [Cranial Nerves Hypoglossal (XII)] : there was no tongue deviation with protrusion [Sclera] : the sclera and conjunctiva were normal [Outer Ear] : the ears and nose were normal in appearance [Neck Appearance] : the appearance of the neck was normal [] : no respiratory distress [Bowel Sounds] : normal bowel sounds [Skin Color & Pigmentation] : normal skin color and pigmentation [FreeTextEntry1] : GAit Instability

## 2019-10-08 NOTE — VITALS
[Least Pain Intensity: 0/10] : 0/10 [Maximal Pain Intensity: 0/10] : 0/10 [80: Normal activity with effort; some signs or symptoms of disease.] : 80: Normal activity with effort; some signs or symptoms of disease.  [ECOG Performance Status: 2 - Ambulatory and capable of all self care but unable to carry out any work activities] : Performance Status: 2 - Ambulatory and capable of all self care but unable to carry out any work activities. Up and about more than 50% of waking hours

## 2019-10-09 VITALS
SYSTOLIC BLOOD PRESSURE: 112 MMHG | HEART RATE: 64 BPM | WEIGHT: 180 LBS | BODY MASS INDEX: 30.9 KG/M2 | RESPIRATION RATE: 17 BRPM | OXYGEN SATURATION: 97 % | DIASTOLIC BLOOD PRESSURE: 74 MMHG

## 2019-10-10 NOTE — HISTORY OF PRESENT ILLNESS
[FreeTextEntry1] : 10/9/19- [facility]- Ms. Holcomb has completed 2000 cGy/ 2000 cGy to the spine (T2- T6).  Today, she reports she feels well. Denies skin irritation, esophagitis, dysphagia, change in energy level, pain. She reports good appetite. She notes new onset this week of a few episodes of urinary urgency (she noted at time of consult that she has chronic urge incontinence). She will follow up with Dr. Hernandez tomorrow afternoon.  She also reports she was given a referral for PT, but has not yet started. \par \par 9/20/19- [facility]\par Ms. 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  She was seen as an inpatient on 8/29/19, and at the time final pathology was pending.  \par \par On 8/27/19, she underwent T2- T4 laminectomy with resection of epidural spinal tumor. Final pathology revealed the following: \par -Spinal mass, biopsy: Plasma cell neoplasm.  \par -Spinal mass, excision: Plasma cell neoplasm.  \par Note: Immunostains show tumor cells are positive for  and are negative for CD20, CAM5.2, S100 and synaptophysin.  Ki-67\par shows a proliferation index of approximately 40%.  Clinical and regular correlation are needed to determine whether this represents multiple myeloma or plasmacytoma.  \par \par FISH for kappa and lambda in-situ hybridization showed lambda light chain restriction.\par \par FLUORESCENCE IN-SITU HYBRIDIZATION (FISH)\par Myeloma prognosis\par \par INTERPRETATION\par 1. Negative for 1q21/CKS1B gain RB1 (13q14) deletion is present.\par Comment: Deletion of RB1 gene (retinoblastoma1) on chromosome 13q14 can be seen in 15-40% of newly diagnosed\par cases of plasma cell myeloma and is an independent adverse prognostic variable, despite its relative frequency. (Blood\par 95:[id number]). Loss of chromosome 13 is also considered a poor prognostic factor if it occurs in conjunction with deletion\par 17p (TP53) or FGFR3/IGH [t(4;14)], and not as a sole abnormality.\par 2. No evidence of CCND1-IGH [translocation t(11;14)] gene rearrangement, and no evidence for trisomy 11 or gain of 11q.\par Comment: 50% of Interphase Nuclei contain 3-4 (orange) and 2-3 (green) signals.\par 3.\par 4. No evidence of p53 (17p13) deletion or amplification.\par 5. No evidence of FGFR3-IGH [translocation t(4;14)] gene rearrangement.\par 6. No evidence of IGH-MAF [translocation t(14;16)] gene rearrangement\par \par She was discharged home from [facility] on 8/30/19.  Her case was reviewed at Neuro tumor board on 9/10/19; consensus as follows: \par Rad- Multiple lesions, Path- Plasma cell neoplasm, likely myeloma, Plan- Referral to Dr. Hackett for RVD regimen.  She followed up with NP Rolly Rodriguez on 9/10/19; plan was to start Physical Therapy and follow up with Dr. Bearden. She followed up with Dr. Beraden on 9/19/19; plan for f/u with radiation, hematology, PCP and tumor board discussion on 9/23. She had been scheduled to see Dr. Hackett on 9/12/19, but patient cancelled her appointment.  She has a long standing relationship with Dr. Renee Hernandez for a "clotting problem" so she has elected to follow-up with him instead.  She states she saw him last week and was recommended chemotherapy and radiation.  He also sent her for a PET/CT, which was done on 9/17/19 at [facility].  \par \par PET/CT 9/17/19 - IMPRESSION: \par 1.  Status post spinal surgery in the thoracic spine with laminectomies at T3-T5.  \par 2.  Hypermetabolic activity in the remaining posterior elements of the T4 and the adjacent paraspinal soft tissues.  Thse findings are nonspecific, and may be post surgical in etiology.  \par 3.  Multiple additional hypermetabolic lytic lesions in the skeleton.  These findings are consistent with multiple myeloma.\par 4.  Otherwise, no abnormal hypermetabolic activity to suggest malignancy at this time.\par 5.  Diverticular disease. \par 6.  Small umbilical hernia containing fat but not bowel.  \par 7.  Low-attenuation/cystic lesions in the right retrocrural space, which are not significantly hypermetabolic on the corresponding PET images, and may represent mildly enlarged lymph nodes. \par \par Today, she reports feeling well since surgery.  She states her pain has resolved.  She no longer feels pain with deep inspiration and endorses only stable dyspnea secondary to her COPD.  She endorses chronic left lower extremity tingling unchanged since back surgery in 2016 and bilateral lower extremity weakness for which she has been referred for PT.  \par \par Of note, she lives with a roommate in [place].  She is a retired mental health worker.  She admits to social smoking over 30 years ago and rare alcohol use.  She denies drug use.  She is post menopausal.  \par \par _________________________________________________\par INPATIENT CONSULT (8/29/19)\par Ms. Yoan Holcomb is a 74 year-old female s/p T2-T4 laminectomy with resection of an epidural spinal tumor.  She began to experience mid-back pain "behind the breasts" in April of 2019.  She states she was evaluated by her PMD, cardiologist and pulmonologist and eventually referred to pain management, Dr. Kiran.  She was treated with meloxicam and baclofen without improvement in her symptoms, which prompted further workup.  She underwent an MRI thoracic spine on 7/11/19, which demonstrated an approximately 3cm intraspinal mass with features suggestive of extradural location from T3-T5 causing spinal cord compression.  She was referred to Dr. Bearden for surgical management and underwent T2-T4 laminectomy with resection of the tumor on 8/27/19.  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  Final pathology is pending. \par \par Today, she reports feeling "crippled" since surgery, although she notes having just ambulated in the hallway with a walker.  She states her back pain has improved, now 3-4/10, exacerbated by activity.  She denies radiation of the pain, numbness and paresthesias.  She denies other complaints to include fevers, chills, headache, dizziness, lightheadedness, dyspnea, chest pain, palpitations, nausea, vomiting, constipation and diarrhea.  She is quite somnolent and requires frequent re-engaging throughout this interview.  \par \par \par MRI Thoracic Spine w/o juice, done 8/27/19 showed the following:                       \par \par COMPARISON: Outside MRI thoracic spine from 07/11/2019 is imported for review, as is outside chest CT from 05/09/2019\par \par FINDINGS:\par \par There is abnormal marrow replacement of the entire T4 posterior elements extending into the left greater than right pedicles into the left posterior T4 vertebral body. Volume has not significantly changed from the recent prior MRI. There may be minimal extension into the left T4-5 neural foramen. Review of prior chest CT shows a heterogeneous appearance of bone with expansion and somewhat prominent internal trabeculation that may suggest the diagnosis of atypical hemangioma.\par \par Extraosseous extension of this into the posterior and lateral epidural spaces is approximately stable as well, spanning 3.4 cm craniocaudad and 1.5 cm transversely. There is anterior deviation and compression of the thoracic spinal cord with only questionable/ faint T2 hyperintense signal without true cord edema signal or expansion. The conus medullaris ends at T12-L1. There is no additional level of cord compression. The partially imaged cervical spine shows multilevel disc herniations with spinal canal \par stenosis at C5-6 and C6-7. Multilevel thoracic disc herniations are present as well, the largest in the right foraminal zone at T11-12.\par \par No compression deformity or gross malalignment. On T1 images, there is pathologic marrow signal at the inferior aspect of the L1 vertebral body that has progressed considerably from the prior MRI. Furthermore, a 7 mm site of marrow replacement in the left posterior superior L1 vertebral body previously measured 5 mm. A 10 mm focus of T1 hyperintense signal in the T10 vertebral body previously measured 8 mm. A few additional smaller foci of T1 hyperintense signal are more conspicuous on the current exam. \par If pathology yields the diagnosis of malignancy, PET-CT is recommended or contrast-enhanced MRI.\par \par A single fiducial marker is placed on the skin at the level of T1.\par \par IMPRESSION:\par -Limited MRI for surgical planning.\par -Posterior T4 bony and epidural mass with cord compression is not significantly changed since 7/11/2019. Although its appearance on CT suggests atypical/ aggressive hemangioma, the presence of additional T1-hypointense marrow lesions in T10 and L1 WHICH HAVE INCREASED is worrisome for malignancy (metastasis, myeloma, lymphoma). Further imaging is advised pending surgical pathology of T4 mass.

## 2019-10-10 NOTE — PHYSICAL EXAM
[Sclera] : the sclera and conjunctiva were normal [Extraocular Movements] : extraocular movements were intact [Outer Ear] : the ears and nose were normal in appearance [Hearing Threshold Finger Rub Not [place]] : hearing was normal [Oropharynx] : The oropharynx was normal [Normal] : no palpable adenopathy [de-identified] : well healed t-spine incision. Grade 1 hyperpigmentation noted. [de-identified] : CN II-XII grossly intact. Strength 5/5 BUE, 5/5 BLE.  Ambulates with cane.  Sensation intact to LT and PP.

## 2019-10-10 NOTE — DISEASE MANAGEMENT
[Clinical] : TNM Stage: c [N/A] : Currently not applicable [FreeTextEntry4] : Multiple Myeloma [NTNM] : . [TTNM] : . [MTNM] : .

## 2019-10-10 NOTE — REVIEW OF SYSTEMS
[Patient Intake Form Reviewed] : Patient intake form was reviewed [Negative] : Heme/Lymph [Dysphagia: Grade 0] : Dysphagia: Grade 0 [Esophagitis: Grade 0] : Esophagitis: Grade 0 [Nausea: Grade 0] : Nausea: Grade 0 [Pruritus: Grade 0] : Pruritus: Grade 0 [Skin Hyperpigmentation: Grade 1 - Hyperpigmentation covering <10% BSA; no psychosocial impact] : Skin Hyperpigmentation: Grade 1 - Hyperpigmentation covering <10% BSA; no psychosocial impact [Dermatitis Radiation: Grade 0] : Dermatitis Radiation: Grade 0 [FreeTextEntry6] : unchanged dyspnea, COPD [FreeTextEntry8] : chronic urge incontinence, unchanged  [FreeTextEntry9] : B/L LE weakness since surgery [de-identified] : healed thoracic spine incision

## 2019-11-06 ENCOUNTER — APPOINTMENT (OUTPATIENT)
Dept: RADIATION ONCOLOGY | Facility: CLINIC | Age: 74
End: 2019-11-06
Payer: MEDICARE

## 2019-11-06 VITALS
BODY MASS INDEX: 31.58 KG/M2 | HEART RATE: 60 BPM | WEIGHT: 184 LBS | OXYGEN SATURATION: 99 % | RESPIRATION RATE: 18 BRPM | SYSTOLIC BLOOD PRESSURE: 130 MMHG | DIASTOLIC BLOOD PRESSURE: 83 MMHG

## 2019-11-06 DIAGNOSIS — R22.2 LOCALIZED SWELLING, MASS AND LUMP, TRUNK: ICD-10-CM

## 2019-11-06 PROCEDURE — 99024 POSTOP FOLLOW-UP VISIT: CPT

## 2019-11-08 NOTE — PHYSICAL EXAM
[Sclera] : the sclera and conjunctiva were normal [Extraocular Movements] : extraocular movements were intact [Outer Ear] : the ears and nose were normal in appearance [Hearing Threshold Finger Rub Not [place]] : hearing was normal [Oropharynx] : The oropharynx was normal [Normal] : oriented to person, place and time, the affect was normal, the mood was normal and not anxious [Range of Motion to Joints] : range of motion to joints [No Spine Tenderness] : no tenderness to palpation of the vertebral spine [Motor Tone] : muscle strength and tone were normal [de-identified] : CN II- XII grossly intact. Strength 5/5 BUE, 5/5 BLE.  Ambulates with cane.  Sensation intact to LT and PP. [de-identified] : well healed thoracic spine incision. Grade 1 hyperpigmentation noted, improving.

## 2019-11-08 NOTE — DISEASE MANAGEMENT
[Clinical] : TNM Stage: c [N/A] : Currently not applicable [TTNM] : . [FreeTextEntry4] : Multiple Myeloma [NTNM] : . [MTNM] : .

## 2019-11-08 NOTE — HISTORY OF PRESENT ILLNESS
[FreeTextEntry1] : Ms. Yoan Holcomb has completed radiation therapy to the spine (T2- T6) for a total of 2000 cGy over 5 fractions from 10/3/19 to 10/9/19 for multiple myeloma, s/p decompressive surgery for thoracic level spinal cord compression. She tolerated her radiation well and did not develop any grade 3 or higher acute toxicities as a result of treatment. \par \par 11/6/19- PTE\par Ms. Holcomb returns for post treatment evaluation. She last followed up with Dr. Hernandez last week at which time blood work was done. She reports she is taking a weekly pill and weekly injection as per Dr. Hernandez but cannot recall the names of these medications. Today, she reports she feels well. Reports stable SOB on exertion and chronic constipation for which she took Dulcolax; last BM was this morning. She started Physical Therapy yesterday. She reports good energy and appetite, has gained four pounds in the past month. She denies back pain, weakness, dysphagia, odynophagia, esophagitis, N/V, dysgeusia, numbness, tingling, CP, palpitations, skin irritation, or any further complaints. \par \par  \par \par 9/20/19- SNA\par Ms. 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  She was seen as an inpatient on 8/29/19, and at the time final pathology was pending.  \par \par On 8/27/19, she underwent T2- T4 laminectomy with resection of epidural spinal tumor. Final pathology revealed the following: \par -Spinal mass, biopsy: Plasma cell neoplasm.  \par -Spinal mass, excision: Plasma cell neoplasm.  \par Note: Immunostains show tumor cells are positive for  and are negative for CD20, CAM5.2, S100 and synaptophysin.  Ki-67\par shows a proliferation index of approximately 40%.  Clinical and regular correlation are needed to determine whether this represents multiple myeloma or plasmacytoma.  \par \par FISH for kappa and lambda in-situ hybridization showed lambda light chain restriction.\par \par FLUORESCENCE IN-SITU HYBRIDIZATION (FISH)\par Myeloma prognosis\par \par INTERPRETATION\par 1. Negative for 1q21/CKS1B gain RB1 (13q14) deletion is present.\par Comment: Deletion of RB1 gene (retinoblastoma1) on chromosome 13q14 can be seen in 15-40% of newly diagnosed\par cases of plasma cell myeloma and is an independent adverse prognostic variable, despite its relative frequency. (Blood\par 95:[id number]). Loss of chromosome 13 is also considered a poor prognostic factor if it occurs in conjunction with deletion\par 17p (TP53) or FGFR3/IGH [t(4;14)], and not as a sole abnormality.\par 2. No evidence of CCND1-IGH [translocation t(11;14)] gene rearrangement, and no evidence for trisomy 11 or gain of 11q.\par Comment: 50% of Interphase Nuclei contain 3-4 (orange) and 2-3 (green) signals.\par 3.\par 4. No evidence of p53 (17p13) deletion or amplification.\par 5. No evidence of FGFR3-IGH [translocation t(4;14)] gene rearrangement.\par 6. No evidence of IGH-MAF [translocation t(14;16)] gene rearrangement\par \par She was discharged home from [facility] on 8/30/19.  Her case was reviewed at Neuro tumor board on 9/10/19; consensus as follows: \par Rad- Multiple lesions, Path- Plasma cell neoplasm, likely myeloma, Plan- Referral to Dr. Hackett for RVD regimen.  She followed up with NP Rolly Rodriguez on 9/10/19; plan was to start Physical Therapy and follow up with Dr. Bearden. She followed up with Dr. Bearden on 9/19/19; plan for f/u with radiation, hematology, PCP and tumor board discussion on 9/23. She had been scheduled to see Dr. Hackett on 9/12/19, but patient cancelled her appointment.  She has a long standing relationship with Dr. Renee Hernandez for a "clotting problem" so she has elected to follow-up with him instead.  She states she saw him last week and was recommended chemotherapy and radiation.  He also sent her for a PET/CT, which was done on 9/17/19 at [facility].  \par \par PET/CT 9/17/19 - IMPRESSION: \par 1.  Status post spinal surgery in the thoracic spine with laminectomies at T3-T5.  \par 2.  Hypermetabolic activity in the remaining posterior elements of the T4 and the adjacent paraspinal soft tissues.  Thse findings are nonspecific, and may be post surgical in etiology.  \par 3.  Multiple additional hypermetabolic lytic lesions in the skeleton.  These findings are consistent with multiple myeloma.\par 4.  Otherwise, no abnormal hypermetabolic activity to suggest malignancy at this time.\par 5.  Diverticular disease. \par 6.  Small umbilical hernia containing fat but not bowel.  \par 7.  Low-attenuation/cystic lesions in the right retrocrural space, which are not significantly hypermetabolic on the corresponding PET images, and may represent mildly enlarged lymph nodes. \par \par Today, she reports feeling well since surgery.  She states her pain has resolved.  She no longer feels pain with deep inspiration and endorses only stable dyspnea secondary to her COPD.  She endorses chronic left lower extremity tingling unchanged since back surgery in 2016 and bilateral lower extremity weakness for which she has been referred for PT.  \par \par Of note, she lives with a roommate in [place].  She is a retired mental health worker.  She admits to social smoking over 30 years ago and rare alcohol use.  She denies drug use.  She is post menopausal.  \par \par _________________________________________________\par INPATIENT CONSULT (8/29/19)\par Ms. Yoan Holcomb is a 74 year-old female s/p T2-T4 laminectomy with resection of an epidural spinal tumor.  She began to experience mid-back pain "behind the breasts" in April of 2019.  She states she was evaluated by her PMD, cardiologist and pulmonologist and eventually referred to pain management, Dr. Kiran.  She was treated with meloxicam and baclofen without improvement in her symptoms, which prompted further workup.  She underwent an MRI thoracic spine on 7/11/19, which demonstrated an approximately 3cm intraspinal mass with features suggestive of extradural location from T3-T5 causing spinal cord compression.  She was referred to Dr. Bearden for surgical management and underwent T2-T4 laminectomy with resection of the tumor on 8/27/19.  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  Final pathology is pending. \par \par Today, she reports feeling "crippled" since surgery, although she notes having just ambulated in the hallway with a walker.  She states her back pain has improved, now 3-4/10, exacerbated by activity.  She denies radiation of the pain, numbness and paresthesias.  She denies other complaints to include fevers, chills, headache, dizziness, lightheadedness, dyspnea, chest pain, palpitations, nausea, vomiting, constipation and diarrhea.  She is quite somnolent and requires frequent re-engaging throughout this interview.  \par \par \par MRI Thoracic Spine w/o juice, done 8/27/19 showed the following:                       \par \par COMPARISON: Outside MRI thoracic spine from 07/11/2019 is imported for review, as is outside chest CT from 05/09/2019\par \par FINDINGS:\par \par There is abnormal marrow replacement of the entire T4 posterior elements extending into the left greater than right pedicles into the left posterior T4 vertebral body. Volume has not significantly changed from the recent prior MRI. There may be minimal extension into the left T4-5 neural foramen. Review of prior chest CT shows a heterogeneous appearance of bone with expansion and somewhat prominent internal trabeculation that may suggest the diagnosis of atypical hemangioma.\par \par Extraosseous extension of this into the posterior and lateral epidural spaces is approximately stable as well, spanning 3.4 cm craniocaudad and 1.5 cm transversely. There is anterior deviation and compression of the thoracic spinal cord with only questionable/ faint T2 hyperintense signal without true cord edema signal or expansion. The conus medullaris ends at T12-L1. There is no additional level of cord compression. The partially imaged cervical spine shows multilevel disc herniations with spinal canal \par stenosis at C5-6 and C6-7. Multilevel thoracic disc herniations are present as well, the largest in the right foraminal zone at T11-12.\par \par No compression deformity or gross malalignment. On T1 images, there is pathologic marrow signal at the inferior aspect of the L1 vertebral body that has progressed considerably from the prior MRI. Furthermore, a 7 mm site of marrow replacement in the left posterior superior L1 vertebral body previously measured 5 mm. A 10 mm focus of T1 hyperintense signal in the T10 vertebral body previously measured 8 mm. A few additional smaller foci of T1 hyperintense signal are more conspicuous on the current exam. \par If pathology yields the diagnosis of malignancy, PET-CT is recommended or contrast-enhanced MRI.\par \par A single fiducial marker is placed on the skin at the level of T1.\par \par IMPRESSION:\par -Limited MRI for surgical planning.\par -Posterior T4 bony and epidural mass with cord compression is not significantly changed since 7/11/2019. Although its appearance on CT suggests atypical/ aggressive hemangioma, the presence of additional T1-hypointense marrow lesions in T10 and L1 WHICH HAVE INCREASED is worrisome for malignancy (metastasis, myeloma, lymphoma). Further imaging is advised pending surgical pathology of T4 mass.

## 2019-11-08 NOTE — REVIEW OF SYSTEMS
[Patient Intake Form Reviewed] : Patient intake form was reviewed [Negative] : Heme/Lymph [Dysphagia: Grade 0] : Dysphagia: Grade 0 [Esophagitis: Grade 0] : Esophagitis: Grade 0 [Nausea: Grade 0] : Nausea: Grade 0 [Pruritus: Grade 0] : Pruritus: Grade 0 [Dermatitis Radiation: Grade 0] : Dermatitis Radiation: Grade 0 [Dysgeusia: Grade 0] : Dysgeusia: Grade 0 [Skin Hyperpigmentation: Grade 1 - Hyperpigmentation covering <10% BSA; no psychosocial impact] : Skin Hyperpigmentation: Grade 1 - Hyperpigmentation covering <10% BSA; no psychosocial impact [FreeTextEntry6] : unchanged dyspnea, COPD [FreeTextEntry8] : chronic urge incontinence, unchanged  [de-identified] : healed thoracic spine incision  [FreeTextEntry9] : B/L LE weakness since surgery

## 2020-01-06 ENCOUNTER — OTHER (OUTPATIENT)
Age: 75
End: 2020-01-06

## 2020-03-04 ENCOUNTER — APPOINTMENT (OUTPATIENT)
Dept: RADIATION ONCOLOGY | Facility: CLINIC | Age: 75
End: 2020-03-04
Payer: MEDICARE

## 2020-03-11 ENCOUNTER — APPOINTMENT (OUTPATIENT)
Dept: RADIATION ONCOLOGY | Facility: CLINIC | Age: 75
End: 2020-03-11
Payer: MEDICARE

## 2020-03-11 VITALS
DIASTOLIC BLOOD PRESSURE: 86 MMHG | SYSTOLIC BLOOD PRESSURE: 148 MMHG | OXYGEN SATURATION: 97 % | BODY MASS INDEX: 30.04 KG/M2 | HEART RATE: 74 BPM | RESPIRATION RATE: 16 BRPM | WEIGHT: 175 LBS

## 2020-03-11 DIAGNOSIS — C90.00 MULTIPLE MYELOMA NOT HAVING ACHIEVED REMISSION: ICD-10-CM

## 2020-03-11 PROCEDURE — 99212 OFFICE O/P EST SF 10 MIN: CPT

## 2020-03-11 NOTE — VITALS
[Maximal Pain Intensity: 4/10] : 4/10 [Pain Location: ___] : Pain Location: [unfilled] [Adjuvant (neuroleptics)] : adjuvant (neuroleptics)

## 2020-03-11 NOTE — REVIEW OF SYSTEMS
[FreeTextEntry6] : unchanged dyspnea, COPD [FreeTextEntry8] : chronic urge incontinence, unchanged  [FreeTextEntry9] : B/L LE weakness since surgery [de-identified] : healed thoracic spine incision

## 2020-03-11 NOTE — HISTORY OF PRESENT ILLNESS
[FreeTextEntry1] : Ms. Yoan Holcomb has completed radiation therapy to the spine (T2- T6) for a total of 2000 cGy over 5 fractions from 10/3/19 to 10/9/19 for multiple myeloma, s/p decompressive surgery for thoracic level spinal cord compression. She tolerated her radiation well and did not develop any grade 3 or higher acute toxicities as a result of treatment. \par \par 3/11/2020- FOLLOW UP\par Ms. Holcomb returns for routine follow up. When she was last seen on 11/6/19, she was doing well after RT. Plan was to continue follow up with Dr. Hernandez who is managing systemic therapy. She opted to discontinue systemic therapy in November. She saw Dr. Hernandez in January and will see him again in 2 weeks. She reports she was diagnosed with herpes zoster on November, and continues to have residual burning pain and numbness to LLE. She is taking Gabapentin. Also sees pain management (Dr. Kiran). She reports she otherwise feels well. Denies CP, SOB, dysphagia, esophagitis, weakness, fever, chills, N/V. Able to ambulate independently, but does use a cane at times. She notes skin to her back is dry at times, uses moisturizing cream. \par \par ____________\par 11/6/19- PTE\par Ms. Holcomb returns for post treatment evaluation. She last followed up with Dr. Hernandez last week at which time blood work was done. She reports she is taking a weekly pill and weekly injection as per Dr. Hernandez but cannot recall the names of these medications. Today, she reports she feels well. Reports stable SOB on exertion and chronic constipation for which she took Dulcolax; last BM was this morning. She started Physical Therapy yesterday. She reports good energy and appetite, has gained four pounds in the past month. She denies back pain, weakness, dysphagia, odynophagia, esophagitis, N/V, dysgeusia, numbness, tingling, CP, palpitations, skin irritation, or any further complaints. \par \par ____________ \par 9/20/19- SNA\par Ms. 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  She was seen as an inpatient on 8/29/19, and at the time final pathology was pending.  \par \par On 8/27/19, she underwent T2- T4 laminectomy with resection of epidural spinal tumor. Final pathology revealed the following: \par -Spinal mass, biopsy: Plasma cell neoplasm.  \par -Spinal mass, excision: Plasma cell neoplasm.  \par Note: Immunostains show tumor cells are positive for  and are negative for CD20, CAM5.2, S100 and synaptophysin.  Ki-67\par shows a proliferation index of approximately 40%.  Clinical and regular correlation are needed to determine whether this represents multiple myeloma or plasmacytoma.  \par \par FISH for kappa and lambda in-situ hybridization showed lambda light chain restriction.\par \par FLUORESCENCE IN-SITU HYBRIDIZATION (FISH)\par Myeloma prognosis\par \par INTERPRETATION\par 1. Negative for 1q21/CKS1B gain RB1 (13q14) deletion is present.\par Comment: Deletion of RB1 gene (retinoblastoma1) on chromosome 13q14 can be seen in 15-40% of newly diagnosed\par cases of plasma cell myeloma and is an independent adverse prognostic variable, despite its relative frequency. (Blood\par 95:[id number]). Loss of chromosome 13 is also considered a poor prognostic factor if it occurs in conjunction with deletion\par 17p (TP53) or FGFR3/IGH [t(4;14)], and not as a sole abnormality.\par 2. No evidence of CCND1-IGH [translocation t(11;14)] gene rearrangement, and no evidence for trisomy 11 or gain of 11q.\par Comment: 50% of Interphase Nuclei contain 3-4 (orange) and 2-3 (green) signals.\par 3.\par 4. No evidence of p53 (17p13) deletion or amplification.\par 5. No evidence of FGFR3-IGH [translocation t(4;14)] gene rearrangement.\par 6. No evidence of IGH-MAF [translocation t(14;16)] gene rearrangement\par \par She was discharged home from [facility] on 8/30/19.  Her case was reviewed at Neuro tumor board on 9/10/19; consensus as follows: \par Rad- Multiple lesions, Path- Plasma cell neoplasm, likely myeloma, Plan- Referral to Dr. Hackett for RVD regimen.  She followed up with NP Rolly Rodriguez on 9/10/19; plan was to start Physical Therapy and follow up with Dr. Bearden. She followed up with Dr. Bearden on 9/19/19; plan for f/u with radiation, hematology, PCP and tumor board discussion on 9/23. She had been scheduled to see Dr. Hackett on 9/12/19, but patient cancelled her appointment.  She has a long standing relationship with Dr. Renee Hernandez for a "clotting problem" so she has elected to follow-up with him instead.  She states she saw him last week and was recommended chemotherapy and radiation.  He also sent her for a PET/CT, which was done on 9/17/19 at [facility].  \par \par PET/CT 9/17/19 - IMPRESSION: \par 1.  Status post spinal surgery in the thoracic spine with laminectomies at T3-T5.  \par 2.  Hypermetabolic activity in the remaining posterior elements of the T4 and the adjacent paraspinal soft tissues.  Thse findings are nonspecific, and may be post surgical in etiology.  \par 3.  Multiple additional hypermetabolic lytic lesions in the skeleton.  These findings are consistent with multiple myeloma.\par 4.  Otherwise, no abnormal hypermetabolic activity to suggest malignancy at this time.\par 5.  Diverticular disease. \par 6.  Small umbilical hernia containing fat but not bowel.  \par 7.  Low-attenuation/cystic lesions in the right retrocrural space, which are not significantly hypermetabolic on the corresponding PET images, and may represent mildly enlarged lymph nodes. \par \par Today, she reports feeling well since surgery.  She states her pain has resolved.  She no longer feels pain with deep inspiration and endorses only stable dyspnea secondary to her COPD.  She endorses chronic left lower extremity tingling unchanged since back surgery in 2016 and bilateral lower extremity weakness for which she has been referred for PT.  \par \par Of note, she lives with a roommate in [place].  She is a retired mental health worker.  She admits to social smoking over 30 years ago and rare alcohol use.  She denies drug use.  She is post menopausal.  \par \par _________________________________________________\par INPATIENT CONSULT (8/29/19)\par Ms. Yoan Holcomb is a 74 year-old female s/p T2-T4 laminectomy with resection of an epidural spinal tumor.  She began to experience mid-back pain "behind the breasts" in April of 2019.  She states she was evaluated by her PMD, cardiologist and pulmonologist and eventually referred to pain management, Dr. Kiran.  She was treated with meloxicam and baclofen without improvement in her symptoms, which prompted further workup.  She underwent an MRI thoracic spine on 7/11/19, which demonstrated an approximately 3cm intraspinal mass with features suggestive of extradural location from T3-T5 causing spinal cord compression.  She was referred to Dr. Bearden for surgical management and underwent T2-T4 laminectomy with resection of the tumor on 8/27/19.  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  Final pathology is pending. \par \par Today, she reports feeling "crippled" since surgery, although she notes having just ambulated in the hallway with a walker.  She states her back pain has improved, now 3-4/10, exacerbated by activity.  She denies radiation of the pain, numbness and paresthesias.  She denies other complaints to include fevers, chills, headache, dizziness, lightheadedness, dyspnea, chest pain, palpitations, nausea, vomiting, constipation and diarrhea.  She is quite somnolent and requires frequent re-engaging throughout this interview.  \par \par \par MRI Thoracic Spine w/o juice, done 8/27/19 showed the following:                       \par \par COMPARISON: Outside MRI thoracic spine from 07/11/2019 is imported for review, as is outside chest CT from 05/09/2019\par \par FINDINGS:\par \par There is abnormal marrow replacement of the entire T4 posterior elements extending into the left greater than right pedicles into the left posterior T4 vertebral body. Volume has not significantly changed from the recent prior MRI. There may be minimal extension into the left T4-5 neural foramen. Review of prior chest CT shows a heterogeneous appearance of bone with expansion and somewhat prominent internal trabeculation that may suggest the diagnosis of atypical hemangioma.\par \par Extraosseous extension of this into the posterior and lateral epidural spaces is approximately stable as well, spanning 3.4 cm craniocaudad and 1.5 cm transversely. There is anterior deviation and compression of the thoracic spinal cord with only questionable/ faint T2 hyperintense signal without true cord edema signal or expansion. The conus medullaris ends at T12-L1. There is no additional level of cord compression. The partially imaged cervical spine shows multilevel disc herniations with spinal canal \par stenosis at C5-6 and C6-7. Multilevel thoracic disc herniations are present as well, the largest in the right foraminal zone at T11-12.\par \par No compression deformity or gross malalignment. On T1 images, there is pathologic marrow signal at the inferior aspect of the L1 vertebral body that has progressed considerably from the prior MRI. Furthermore, a 7 mm site of marrow replacement in the left posterior superior L1 vertebral body previously measured 5 mm. A 10 mm focus of T1 hyperintense signal in the T10 vertebral body previously measured 8 mm. A few additional smaller foci of T1 hyperintense signal are more conspicuous on the current exam. \par If pathology yields the diagnosis of malignancy, PET-CT is recommended or contrast-enhanced MRI.\par \par A single fiducial marker is placed on the skin at the level of T1.\par \par IMPRESSION:\par -Limited MRI for surgical planning.\par -Posterior T4 bony and epidural mass with cord compression is not significantly changed since 7/11/2019. Although its appearance on CT suggests atypical/ aggressive hemangioma, the presence of additional T1-hypointense marrow lesions in T10 and L1 WHICH HAVE INCREASED is worrisome for malignancy (metastasis, myeloma, lymphoma). Further imaging is advised pending surgical pathology of T4 mass.

## 2020-03-11 NOTE — PHYSICAL EXAM
[de-identified] : No pain with percussion of the spine.   She must push herself with her arms out of a chair.  [de-identified] : well healed thoracic spine incision. Grade 1 hyperpigmentation noted, improving. [de-identified] : CN II- XII grossly intact. Strength 5/5 BUE, 5/5 BLE.  Usually ambulates with a cane, but can manage without.  Takes small steps and she generally feels she is falling forward.  Rombert is negative.

## 2020-09-22 ENCOUNTER — APPOINTMENT (OUTPATIENT)
Dept: NEUROLOGY | Facility: CLINIC | Age: 75
End: 2020-09-22

## 2020-10-20 ENCOUNTER — APPOINTMENT (OUTPATIENT)
Dept: NEUROLOGY | Facility: CLINIC | Age: 75
End: 2020-10-20
Payer: MEDICARE

## 2020-10-20 VITALS
OXYGEN SATURATION: 96 % | DIASTOLIC BLOOD PRESSURE: 84 MMHG | HEART RATE: 67 BPM | SYSTOLIC BLOOD PRESSURE: 159 MMHG | HEIGHT: 64 IN | BODY MASS INDEX: 31.41 KG/M2 | WEIGHT: 184 LBS | TEMPERATURE: 97.7 F

## 2020-10-20 DIAGNOSIS — G62.9 POLYNEUROPATHY, UNSPECIFIED: ICD-10-CM

## 2020-10-20 PROCEDURE — 99072 ADDL SUPL MATRL&STAF TM PHE: CPT

## 2020-10-20 PROCEDURE — 99215 OFFICE O/P EST HI 40 MIN: CPT

## 2020-10-20 RX ORDER — HYDROCHLOROTHIAZIDE 25 MG/1
25 TABLET ORAL
Refills: 0 | Status: DISCONTINUED | COMMUNITY
End: 2020-10-20

## 2020-10-20 RX ORDER — METOPROLOL SUCCINATE 100 MG/1
100 TABLET, EXTENDED RELEASE ORAL DAILY
Refills: 0 | Status: ACTIVE | COMMUNITY

## 2020-10-20 RX ORDER — AZELASTINE HYDROCHLORIDE 0.5 MG/ML
0.05 SOLUTION/ DROPS OPHTHALMIC TWICE DAILY
Refills: 0 | Status: ACTIVE | COMMUNITY

## 2020-10-20 RX ORDER — ASPIRIN 81 MG
81 TABLET, DELAYED RELEASE (ENTERIC COATED) ORAL
Refills: 0 | Status: DISCONTINUED | COMMUNITY
End: 2020-10-20

## 2020-10-20 RX ORDER — TRAMADOL HYDROCHLORIDE 50 MG/1
50 TABLET, COATED ORAL EVERY 8 HOURS
Refills: 0 | Status: ACTIVE | COMMUNITY

## 2020-10-20 RX ORDER — GABAPENTIN 300 MG/1
300 CAPSULE ORAL TWICE DAILY
Refills: 0 | Status: ACTIVE | COMMUNITY

## 2020-10-20 RX ORDER — BACLOFEN 10 MG/1
10 TABLET ORAL 3 TIMES DAILY
Refills: 0 | Status: ACTIVE | COMMUNITY

## 2020-10-20 RX ORDER — PANTOPRAZOLE 40 MG/1
40 TABLET, DELAYED RELEASE ORAL DAILY
Refills: 0 | Status: ACTIVE | COMMUNITY

## 2020-10-20 RX ORDER — ROSUVASTATIN CALCIUM 5 MG/1
5 TABLET, FILM COATED ORAL
Qty: 30 | Refills: 0 | Status: DISCONTINUED | COMMUNITY
Start: 2018-03-19 | End: 2020-10-20

## 2020-10-20 RX ORDER — DULOXETINE HYDROCHLORIDE 30 MG/1
30 CAPSULE, DELAYED RELEASE PELLETS ORAL
Qty: 30 | Refills: 3 | Status: ACTIVE | COMMUNITY
Start: 2020-10-20 | End: 1900-01-01

## 2020-10-20 RX ORDER — ALBUTEROL SULFATE 90 UG/1
108 (90 BASE) INHALANT RESPIRATORY (INHALATION) EVERY 6 HOURS
Refills: 0 | Status: ACTIVE | COMMUNITY

## 2020-10-20 NOTE — ASSESSMENT
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 Neurological examination shows pain upon touch and ambulating on the left side. 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 \par - Switch when to take Gabapentin 2-300mg at bedtime\par -Stop Tramadol\par -Neuropathy Labs\par -Go back to pain management if needed for pain injection\par \par f/u in 2 weeks via phone for medication adjustment \par f/u in 6 weeks w/ Dr. Claire

## 2020-10-20 NOTE — PHYSICAL EXAM
[FreeTextEntry1] : Constitutional: alert, in no acute distress, well nourished and well developed.\par Psychiatric: insight and judgment were intact.\par \par Neurologic: \par Mental Status: The patient is alert, attentive, and oriented to person, place, and time. Speech is clear and fluent with good repetition, comprehension, and naming.  Can spell the word WORLD backwards; can name [place] presidents from current to [place]. Patient can name months backwards. \par Cranial nerves:\par CN II: Visual fields are full to confrontation. Fundoscopic exam is normal with sharp discs and no vascular changes.Visual acuity is intact. \par CN III, IV, VI: EOMI, PERRLA\par CN V: Facial sensation is intact to pinprick in all 3 divisions bilaterally. Corneal responses are intact.\par CN VII: Face is symmetric with normal eye closure and smile.\par CN VIII: Hearing is normal to rubbing fingers\par CN IX, X: Palate elevates symmetrically. Phonation is normal.\par CN XI: Head turning and shoulder shrug are intact and symmetric.\par CN XII: Tongue is midline with normal movements and no atrophy and no deviation with protrusion.\par Motor: There is no pronator drift of out-stretched arms. Muscle bulk and tone is normal. Strength is full bilaterally 5/5 on both UE and both LE. \par Sensation: light touch is intact-- pain on the left side\par Reflexes:Reflexes are 2+ and symmetric at the biceps, triceps, knees, and ankles.\par Coordination: Rapid alternating movements and fine finger movements are intact. There is no dysmetria on finger-to-nose and heel-knee-shin. There are no abnormal or extraneous movements. Negative Romberg. \par Gait/Stance: Posture is normal. Gait is steady with normal steps, base, arm swing, and turning- but slow due to pain. \par \par \par \par

## 2020-10-20 NOTE — HISTORY OF PRESENT ILLNESS
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 \par \par Left sided pain- started the first week of December 2019 and had shingles. After shingles, had the pain that starts on the hip and goes down to the left knee (where the small veins) and stayed inflamed all the time. The pain comes and goes. Patient says she's on gabapentin 2(300mg) and tramadol 50mg (PRN)- her PCP prescribed it to her. The pain is an 8 out of 10 currently. The pain is constant "hurt" and there is certain times that it's too tender and can't touch or walk.\par \par She says when she wakes up in the morning, it's slight, but then walks during the day for it to go away but it's always there.\par \par She says since COVID began, her memory is poor- she says no energy and lays down all the time. Patient says it may be depression. She says when she is laying down, she doesn't want to get up and do something. \par \par Patient says she takes gabapentin in the day time- says after with breakfast, will go to sleep.\par \par Had seen Dr. Kiran-pain management in Dec/Jan 2020 for injection (2) and said it was painful. \par

## 2020-12-08 ENCOUNTER — APPOINTMENT (OUTPATIENT)
Dept: NEUROLOGY | Facility: CLINIC | Age: 75
End: 2020-12-08
Payer: MEDICARE

## 2020-12-08 VITALS
WEIGHT: 183 LBS | HEIGHT: 64 IN | BODY MASS INDEX: 31.24 KG/M2 | SYSTOLIC BLOOD PRESSURE: 148 MMHG | HEART RATE: 71 BPM | TEMPERATURE: 97.9 F | DIASTOLIC BLOOD PRESSURE: 92 MMHG | OXYGEN SATURATION: 93 %

## 2020-12-08 DIAGNOSIS — B02.29 OTHER POSTHERPETIC NERVOUS SYSTEM INVOLVEMENT: ICD-10-CM

## 2020-12-08 PROCEDURE — 99072 ADDL SUPL MATRL&STAF TM PHE: CPT

## 2020-12-08 PROCEDURE — 99214 OFFICE O/P EST MOD 30 MIN: CPT

## 2020-12-08 RX ORDER — DULOXETINE HYDROCHLORIDE 30 MG/1
30 CAPSULE, DELAYED RELEASE PELLETS ORAL
Qty: 60 | Refills: 2 | Status: ACTIVE | COMMUNITY
Start: 2020-12-08 | End: 1900-01-01

## 2020-12-08 RX ORDER — GABAPENTIN 100 MG/1
100 CAPSULE ORAL
Qty: 30 | Refills: 5 | Status: ACTIVE | COMMUNITY
Start: 2020-12-08 | End: 1900-01-01

## 2020-12-14 LAB
FOLATE SERPL-MCNC: 14.4 NG/ML
VIT B12 SERPL-MCNC: >2000 PG/ML

## 2020-12-18 LAB
HOMOCYSTEINE LEVEL: 6.5 UMOL/L
METHYLMALONIC ACID LEVEL: 167 NMOL/L

## 2024-10-15 NOTE — H&P ADULT - ENMT
Stop glipizide, increase Basaglar to 45 units twice a day.  Report sugars in the next week.  Start pioglitazone once a day.  We are going to start B12 shots and see if the clinical pharmacy program can help us get medicines to help your diabetes   negative No oral lesions; no gross abnormalities